# Patient Record
Sex: FEMALE | Race: WHITE
[De-identification: names, ages, dates, MRNs, and addresses within clinical notes are randomized per-mention and may not be internally consistent; named-entity substitution may affect disease eponyms.]

---

## 2017-05-03 ENCOUNTER — HOSPITAL ENCOUNTER (OUTPATIENT)
Dept: HOSPITAL 35 - CAT | Age: 82
End: 2017-05-03
Attending: INTERNAL MEDICINE
Payer: COMMERCIAL

## 2017-05-03 DIAGNOSIS — R91.8: Primary | ICD-10-CM

## 2017-09-13 ENCOUNTER — HOSPITAL ENCOUNTER (OUTPATIENT)
Dept: HOSPITAL 35 - RAD | Age: 82
End: 2017-09-13
Attending: INTERNAL MEDICINE
Payer: COMMERCIAL

## 2017-09-13 DIAGNOSIS — I51.7: Primary | ICD-10-CM

## 2018-01-04 ENCOUNTER — HOSPITAL ENCOUNTER (INPATIENT)
Dept: HOSPITAL 35 - ER | Age: 83
LOS: 6 days | Discharge: HOME | DRG: 177 | End: 2018-01-10
Attending: HOSPITALIST | Admitting: HOSPITALIST
Payer: COMMERCIAL

## 2018-01-04 VITALS — BODY MASS INDEX: 25.6 KG/M2 | WEIGHT: 127 LBS | HEIGHT: 59.02 IN

## 2018-01-04 VITALS — DIASTOLIC BLOOD PRESSURE: 80 MMHG | SYSTOLIC BLOOD PRESSURE: 171 MMHG

## 2018-01-04 VITALS — DIASTOLIC BLOOD PRESSURE: 52 MMHG | SYSTOLIC BLOOD PRESSURE: 166 MMHG

## 2018-01-04 VITALS — SYSTOLIC BLOOD PRESSURE: 167 MMHG | DIASTOLIC BLOOD PRESSURE: 72 MMHG

## 2018-01-04 VITALS — SYSTOLIC BLOOD PRESSURE: 151 MMHG | DIASTOLIC BLOOD PRESSURE: 61 MMHG

## 2018-01-04 DIAGNOSIS — E78.00: ICD-10-CM

## 2018-01-04 DIAGNOSIS — Z88.2: ICD-10-CM

## 2018-01-04 DIAGNOSIS — K80.20: ICD-10-CM

## 2018-01-04 DIAGNOSIS — E78.5: ICD-10-CM

## 2018-01-04 DIAGNOSIS — K57.90: ICD-10-CM

## 2018-01-04 DIAGNOSIS — Z98.41: ICD-10-CM

## 2018-01-04 DIAGNOSIS — N17.9: ICD-10-CM

## 2018-01-04 DIAGNOSIS — E43: ICD-10-CM

## 2018-01-04 DIAGNOSIS — Z85.3: ICD-10-CM

## 2018-01-04 DIAGNOSIS — Z96.1: ICD-10-CM

## 2018-01-04 DIAGNOSIS — D50.9: ICD-10-CM

## 2018-01-04 DIAGNOSIS — M81.0: ICD-10-CM

## 2018-01-04 DIAGNOSIS — Z90.49: ICD-10-CM

## 2018-01-04 DIAGNOSIS — I25.10: ICD-10-CM

## 2018-01-04 DIAGNOSIS — Z95.5: ICD-10-CM

## 2018-01-04 DIAGNOSIS — K21.9: ICD-10-CM

## 2018-01-04 DIAGNOSIS — Z95.1: ICD-10-CM

## 2018-01-04 DIAGNOSIS — Z88.0: ICD-10-CM

## 2018-01-04 DIAGNOSIS — Z98.42: ICD-10-CM

## 2018-01-04 DIAGNOSIS — H35.30: ICD-10-CM

## 2018-01-04 DIAGNOSIS — E87.2: ICD-10-CM

## 2018-01-04 DIAGNOSIS — J69.0: Primary | ICD-10-CM

## 2018-01-04 DIAGNOSIS — N18.9: ICD-10-CM

## 2018-01-04 DIAGNOSIS — Z90.13: ICD-10-CM

## 2018-01-04 DIAGNOSIS — I12.9: ICD-10-CM

## 2018-01-04 DIAGNOSIS — E87.1: ICD-10-CM

## 2018-01-04 DIAGNOSIS — K44.9: ICD-10-CM

## 2018-01-04 LAB
%HYPO/RBC NFR BLD AUTO: (no result) %
ALBUMIN SERPL-MCNC: 3.8 G/DL (ref 3.4–5)
ALT SERPL-CCNC: 17 U/L (ref 30–65)
ANION GAP SERPL CALC-SCNC: 10 MMOL/L (ref 7–16)
ANISOCYTOSIS BLD QL SMEAR: (no result)
AST SERPL-CCNC: 14 U/L (ref 15–37)
BILIRUB SERPL-MCNC: 0.3 MG/DL
BUN SERPL-MCNC: 23 MG/DL (ref 7–18)
CALCIUM SERPL-MCNC: 9.2 MG/DL (ref 8.5–10.1)
CHLORIDE SERPL-SCNC: 96 MMOL/L (ref 98–107)
CO2 SERPL-SCNC: 24 MMOL/L (ref 21–32)
CREAT SERPL-MCNC: 1.9 MG/DL (ref 0.6–1)
ERYTHROCYTE [DISTWIDTH] IN BLOOD BY AUTOMATED COUNT: 14.4 % (ref 10.5–14.5)
GLUCOSE SERPL-MCNC: 119 MG/DL (ref 74–106)
GRANULOCYTES NFR BLD MANUAL: 89 % (ref 36–66)
HCT VFR BLD CALC: 26 % (ref 37–47)
HGB BLD-MCNC: 9.2 GM/DL (ref 12–15)
LYMPHOCYTES NFR BLD AUTO: 7 % (ref 24–44)
MAGNESIUM SERPL-MCNC: 1.9 MG/DL (ref 1.8–2.4)
MCH RBC QN AUTO: 28.1 PG (ref 26–34)
MCHC RBC AUTO-ENTMCNC: 35.3 G/DL (ref 28–37)
MCV RBC: 79.7 FL (ref 80–100)
MICROCYTES: (no result)
MONOCYTES NFR BLD: 4 % (ref 1–8)
NEUTROPHILS # BLD: 5.8 THOU/UL (ref 1.4–8.2)
PLATELET # BLD: 257 THOU/UL (ref 150–400)
POLYCHROMASIA BLD QL SMEAR: (no result)
POTASSIUM SERPL-SCNC: 4.2 MMOL/L (ref 3.5–5.1)
PROT SERPL-MCNC: 7.4 G/DL (ref 6.4–8.2)
RBC # BLD AUTO: 3.27 MIL/UL (ref 4.2–5)
SODIUM SERPL-SCNC: 130 MMOL/L (ref 136–145)
TROPONIN I SERPL-MCNC: < 0.04 NG/ML (ref ?–0.06)
WBC # BLD AUTO: 6.5 THOU/UL (ref 4–11)

## 2018-01-04 PROCEDURE — 10195: CPT

## 2018-01-04 PROCEDURE — 62900: CPT

## 2018-01-04 PROCEDURE — 62110: CPT

## 2018-01-04 PROCEDURE — 70005: CPT

## 2018-01-05 VITALS — SYSTOLIC BLOOD PRESSURE: 122 MMHG | DIASTOLIC BLOOD PRESSURE: 69 MMHG

## 2018-01-05 VITALS — SYSTOLIC BLOOD PRESSURE: 136 MMHG | DIASTOLIC BLOOD PRESSURE: 51 MMHG

## 2018-01-05 VITALS — DIASTOLIC BLOOD PRESSURE: 58 MMHG | SYSTOLIC BLOOD PRESSURE: 123 MMHG

## 2018-01-05 VITALS — SYSTOLIC BLOOD PRESSURE: 144 MMHG | DIASTOLIC BLOOD PRESSURE: 64 MMHG

## 2018-01-05 VITALS — SYSTOLIC BLOOD PRESSURE: 129 MMHG | DIASTOLIC BLOOD PRESSURE: 62 MMHG

## 2018-01-06 VITALS — SYSTOLIC BLOOD PRESSURE: 153 MMHG | DIASTOLIC BLOOD PRESSURE: 71 MMHG

## 2018-01-06 VITALS — SYSTOLIC BLOOD PRESSURE: 124 MMHG | DIASTOLIC BLOOD PRESSURE: 53 MMHG

## 2018-01-06 VITALS — SYSTOLIC BLOOD PRESSURE: 118 MMHG | DIASTOLIC BLOOD PRESSURE: 48 MMHG

## 2018-01-06 VITALS — DIASTOLIC BLOOD PRESSURE: 98 MMHG | SYSTOLIC BLOOD PRESSURE: 134 MMHG

## 2018-01-06 LAB
BILIRUB UR-MCNC: NEGATIVE MG/DL
COLOR UR: YELLOW
KETONES UR STRIP-MCNC: NEGATIVE MG/DL
RBC # UR STRIP: NEGATIVE /UL
SP GR UR STRIP: 1.02 (ref 1–1.03)
URINE CLARITY: CLEAR
URINE GLUCOSE-RANDOM*: NEGATIVE
URINE LEUKOCYTES-REFLEX: NEGATIVE
URINE NITRITE-REFLEX: NEGATIVE
URINE PROTEIN (DIPSTICK): NEGATIVE
UROBILINOGEN UR STRIP-ACNC: 0.2 E.U./DL (ref 0.2–1)

## 2018-01-07 VITALS — SYSTOLIC BLOOD PRESSURE: 145 MMHG | DIASTOLIC BLOOD PRESSURE: 70 MMHG

## 2018-01-07 VITALS — SYSTOLIC BLOOD PRESSURE: 130 MMHG | DIASTOLIC BLOOD PRESSURE: 53 MMHG

## 2018-01-07 VITALS — SYSTOLIC BLOOD PRESSURE: 158 MMHG | DIASTOLIC BLOOD PRESSURE: 64 MMHG

## 2018-01-07 VITALS — SYSTOLIC BLOOD PRESSURE: 161 MMHG | DIASTOLIC BLOOD PRESSURE: 72 MMHG

## 2018-01-07 LAB
ALBUMIN SERPL-MCNC: 3.1 G/DL (ref 3.4–5)
ANION GAP SERPL CALC-SCNC: 11 MMOL/L (ref 7–16)
ANION GAP SERPL CALC-SCNC: 12 MMOL/L (ref 7–16)
BILIRUB UR-MCNC: NEGATIVE MG/DL
BUN SERPL-MCNC: 31 MG/DL (ref 7–18)
BUN SERPL-MCNC: 32 MG/DL (ref 7–18)
CALCIUM SERPL-MCNC: 7.8 MG/DL (ref 8.5–10.1)
CALCIUM SERPL-MCNC: 7.9 MG/DL (ref 8.5–10.1)
CHLORIDE SERPL-SCNC: 89 MMOL/L (ref 98–107)
CHLORIDE SERPL-SCNC: 90 MMOL/L (ref 98–107)
CO2 SERPL-SCNC: 18 MMOL/L (ref 21–32)
CO2 SERPL-SCNC: 18 MMOL/L (ref 21–32)
COLOR UR: YELLOW
CREAT SERPL-MCNC: 2 MG/DL (ref 0.6–1)
CREAT SERPL-MCNC: 2.1 MG/DL (ref 0.6–1)
ERYTHROCYTE [DISTWIDTH] IN BLOOD BY AUTOMATED COUNT: 14.8 % (ref 10.5–14.5)
GLUCOSE SERPL-MCNC: 140 MG/DL (ref 74–106)
GLUCOSE SERPL-MCNC: 140 MG/DL (ref 74–106)
HCT VFR BLD CALC: 20.7 % (ref 37–47)
HGB BLD-MCNC: 7.3 GM/DL (ref 12–15)
KETONES UR STRIP-MCNC: NEGATIVE MG/DL
MCH RBC QN AUTO: 27.2 PG (ref 26–34)
MCHC RBC AUTO-ENTMCNC: 35.5 G/DL (ref 28–37)
MCV RBC: 76.6 FL (ref 80–100)
NITRITE UR QL STRIP: NEGATIVE
PHOSPHATE SERPL-MCNC: 4.3 MG/DL (ref 2.5–4.9)
PLATELET # BLD: 226 THOU/UL (ref 150–400)
POTASSIUM SERPL-SCNC: 4.8 MMOL/L (ref 3.5–5.1)
POTASSIUM SERPL-SCNC: 4.9 MMOL/L (ref 3.5–5.1)
RBC # BLD AUTO: 2.7 MIL/UL (ref 4.2–5)
RBC # UR STRIP: NEGATIVE /UL
SODIUM SERPL-SCNC: 118 MMOL/L (ref 136–145)
SODIUM SERPL-SCNC: 120 MMOL/L (ref 136–145)
SP GR UR STRIP: 1.01 (ref 1–1.03)
URINE CLARITY: CLEAR
URINE CREATININE-RANDOM*: 57 MG/DL
URINE GLUCOSE-RANDOM*: NEGATIVE
URINE LEUKOCYTES: NEGATIVE
URINE PROTEIN (DIPSTICK): NEGATIVE
URINE PROTEIN-RANDOM*: 15.6 MG/DL (ref ?–11.9)
URINE SODIUM-RANDOM*: 25 MMOL/L
UROBILINOGEN UR STRIP-ACNC: 0.2 E.U./DL (ref 0.2–1)
WBC # BLD AUTO: 10.1 THOU/UL (ref 4–11)

## 2018-01-08 VITALS — DIASTOLIC BLOOD PRESSURE: 72 MMHG | SYSTOLIC BLOOD PRESSURE: 143 MMHG

## 2018-01-08 VITALS — SYSTOLIC BLOOD PRESSURE: 174 MMHG | DIASTOLIC BLOOD PRESSURE: 75 MMHG

## 2018-01-08 VITALS — SYSTOLIC BLOOD PRESSURE: 151 MMHG | DIASTOLIC BLOOD PRESSURE: 78 MMHG

## 2018-01-08 VITALS — SYSTOLIC BLOOD PRESSURE: 176 MMHG | DIASTOLIC BLOOD PRESSURE: 96 MMHG

## 2018-01-08 LAB
ALBUMIN SERPL-MCNC: 3.3 G/DL (ref 3.4–5)
ALT SERPL-CCNC: 44 U/L (ref 30–65)
ANION GAP SERPL CALC-SCNC: 12 MMOL/L (ref 7–16)
AST SERPL-CCNC: 27 U/L (ref 15–37)
BILIRUB SERPL-MCNC: 0.4 MG/DL
BUN SERPL-MCNC: 33 MG/DL (ref 7–18)
CALCIUM SERPL-MCNC: 8.1 MG/DL (ref 8.5–10.1)
CHLORIDE SERPL-SCNC: 88 MMOL/L (ref 98–107)
CO2 SERPL-SCNC: 19 MMOL/L (ref 21–32)
CREAT SERPL-MCNC: 1.8 MG/DL (ref 0.6–1)
GLUCOSE SERPL-MCNC: 135 MG/DL (ref 74–106)
PHOSPHATE SERPL-MCNC: 4.1 MG/DL (ref 2.5–4.9)
POTASSIUM SERPL-SCNC: 4.7 MMOL/L (ref 3.5–5.1)
PROT SERPL-MCNC: 6 G/DL (ref 6.4–8.2)
SODIUM SERPL-SCNC: 119 MMOL/L (ref 136–145)

## 2018-01-09 VITALS — DIASTOLIC BLOOD PRESSURE: 63 MMHG | SYSTOLIC BLOOD PRESSURE: 153 MMHG

## 2018-01-09 VITALS — SYSTOLIC BLOOD PRESSURE: 157 MMHG | DIASTOLIC BLOOD PRESSURE: 78 MMHG

## 2018-01-09 VITALS — SYSTOLIC BLOOD PRESSURE: 154 MMHG | DIASTOLIC BLOOD PRESSURE: 82 MMHG

## 2018-01-09 VITALS — SYSTOLIC BLOOD PRESSURE: 148 MMHG | DIASTOLIC BLOOD PRESSURE: 63 MMHG

## 2018-01-09 LAB
ALBUMIN SERPL-MCNC: 3.2 G/DL (ref 3.4–5)
ANION GAP SERPL CALC-SCNC: 11 MMOL/L (ref 7–16)
ANION GAP SERPL CALC-SCNC: 11 MMOL/L (ref 7–16)
ANISOCYTOSIS BLD QL SMEAR: (no result)
BASOPHILS NFR BLD AUTO: 0 % (ref 0–2)
BUN SERPL-MCNC: 31 MG/DL (ref 7–18)
BUN SERPL-MCNC: 31 MG/DL (ref 7–18)
CALCIUM SERPL-MCNC: 8.2 MG/DL (ref 8.5–10.1)
CALCIUM SERPL-MCNC: 8.3 MG/DL (ref 8.5–10.1)
CHLORIDE SERPL-SCNC: 89 MMOL/L (ref 98–107)
CHLORIDE SERPL-SCNC: 89 MMOL/L (ref 98–107)
CO2 SERPL-SCNC: 19 MMOL/L (ref 21–32)
CO2 SERPL-SCNC: 19 MMOL/L (ref 21–32)
CREAT SERPL-MCNC: 1.7 MG/DL (ref 0.6–1)
CREAT SERPL-MCNC: 1.9 MG/DL (ref 0.6–1)
EOSINOPHIL NFR BLD: 0 % (ref 0–3)
ERYTHROCYTE [DISTWIDTH] IN BLOOD BY AUTOMATED COUNT: 14.5 % (ref 10.5–14.5)
GLUCOSE SERPL-MCNC: 125 MG/DL (ref 74–106)
GLUCOSE SERPL-MCNC: 95 MG/DL (ref 74–106)
GRANULOCYTES NFR BLD MANUAL: 79 % (ref 36–66)
HCT VFR BLD CALC: 23.7 % (ref 37–47)
HGB BLD-MCNC: 8.1 GM/DL (ref 12–15)
IRON SERPL-MCNC: 21 UG/DL (ref 50–170)
LYMPHOCYTES NFR BLD AUTO: 11 % (ref 24–44)
MCH RBC QN AUTO: 27.1 PG (ref 26–34)
MCHC RBC AUTO-ENTMCNC: 34.2 G/DL (ref 28–37)
MCV RBC: 79.3 FL (ref 80–100)
MICROCYTES: (no result)
MONOCYTES NFR BLD: 8 % (ref 1–8)
NEUTROPHILS # BLD: 8.8 THOU/UL (ref 1.4–8.2)
NEUTS BAND NFR BLD: 1 % (ref 0–8)
PHOSPHATE SERPL-MCNC: 4 MG/DL (ref 2.5–4.9)
PLATELET # BLD: 289 THOU/UL (ref 150–400)
POTASSIUM SERPL-SCNC: 4.4 MMOL/L (ref 3.5–5.1)
POTASSIUM SERPL-SCNC: 4.4 MMOL/L (ref 3.5–5.1)
RBC # BLD AUTO: 2.99 MIL/UL (ref 4.2–5)
SAO2 % BLD FROM PO2: 7 % (ref 20–39)
SODIUM SERPL-SCNC: 119 MMOL/L (ref 136–145)
SODIUM SERPL-SCNC: 119 MMOL/L (ref 136–145)
TIBC SERPL-MCNC: 314 UG/DL (ref 250–450)
VARIANT LYMPHS NFR BLD MANUAL: 1 %
WBC # BLD AUTO: 11 THOU/UL (ref 4–11)

## 2018-01-10 VITALS — DIASTOLIC BLOOD PRESSURE: 59 MMHG | SYSTOLIC BLOOD PRESSURE: 143 MMHG

## 2018-01-10 VITALS — DIASTOLIC BLOOD PRESSURE: 63 MMHG | SYSTOLIC BLOOD PRESSURE: 148 MMHG

## 2018-01-10 LAB
ANION GAP SERPL CALC-SCNC: 11 MMOL/L (ref 7–16)
BASOPHILS NFR BLD AUTO: 0.1 % (ref 0–2)
BUN SERPL-MCNC: 31 MG/DL (ref 7–18)
CALCIUM SERPL-MCNC: 8.3 MG/DL (ref 8.5–10.1)
CHLORIDE SERPL-SCNC: 95 MMOL/L (ref 98–107)
CO2 SERPL-SCNC: 21 MMOL/L (ref 21–32)
CREAT SERPL-MCNC: 1.8 MG/DL (ref 0.6–1)
EOSINOPHIL NFR BLD: 0.2 % (ref 0–3)
ERYTHROCYTE [DISTWIDTH] IN BLOOD BY AUTOMATED COUNT: 14.4 % (ref 10.5–14.5)
GLUCOSE SERPL-MCNC: 91 MG/DL (ref 74–106)
GRANULOCYTES NFR BLD MANUAL: 80 % (ref 36–66)
HCT VFR BLD CALC: 23.5 % (ref 37–47)
HGB BLD-MCNC: 7.9 GM/DL (ref 12–15)
LYMPHOCYTES NFR BLD AUTO: 8.1 % (ref 24–44)
MCH RBC QN AUTO: 26.5 PG (ref 26–34)
MCHC RBC AUTO-ENTMCNC: 33.6 G/DL (ref 28–37)
MCV RBC: 78.7 FL (ref 80–100)
MONOCYTES NFR BLD: 11.6 % (ref 1–8)
NEUTROPHILS # BLD: 7 THOU/UL (ref 1.4–8.2)
PLATELET # BLD: 246 THOU/UL (ref 150–400)
POTASSIUM SERPL-SCNC: 3.7 MMOL/L (ref 3.5–5.1)
RBC # BLD AUTO: 2.98 MIL/UL (ref 4.2–5)
SODIUM SERPL-SCNC: 127 MMOL/L (ref 136–145)
WBC # BLD AUTO: 8.7 THOU/UL (ref 4–11)

## 2018-01-10 PROCEDURE — 0DB68ZX EXCISION OF STOMACH, VIA NATURAL OR ARTIFICIAL OPENING ENDOSCOPIC, DIAGNOSTIC: ICD-10-PCS | Performed by: INTERNAL MEDICINE

## 2018-01-10 PROCEDURE — 0D758ZZ DILATION OF ESOPHAGUS, VIA NATURAL OR ARTIFICIAL OPENING ENDOSCOPIC: ICD-10-PCS | Performed by: INTERNAL MEDICINE

## 2018-07-15 ENCOUNTER — HOSPITAL ENCOUNTER (INPATIENT)
Dept: HOSPITAL 96 - M.ERS | Age: 83
LOS: 5 days | Discharge: HOME | DRG: 417 | End: 2018-07-20
Attending: INTERNAL MEDICINE | Admitting: INTERNAL MEDICINE
Payer: MEDICARE

## 2018-07-15 VITALS — HEIGHT: 59.02 IN | BODY MASS INDEX: 25.8 KG/M2 | WEIGHT: 128 LBS

## 2018-07-15 VITALS — SYSTOLIC BLOOD PRESSURE: 147 MMHG | DIASTOLIC BLOOD PRESSURE: 82 MMHG

## 2018-07-15 VITALS — SYSTOLIC BLOOD PRESSURE: 123 MMHG | DIASTOLIC BLOOD PRESSURE: 44 MMHG

## 2018-07-15 VITALS — SYSTOLIC BLOOD PRESSURE: 173 MMHG | DIASTOLIC BLOOD PRESSURE: 67 MMHG

## 2018-07-15 VITALS — DIASTOLIC BLOOD PRESSURE: 64 MMHG | SYSTOLIC BLOOD PRESSURE: 175 MMHG

## 2018-07-15 DIAGNOSIS — Z90.49: ICD-10-CM

## 2018-07-15 DIAGNOSIS — N39.0: ICD-10-CM

## 2018-07-15 DIAGNOSIS — Z88.0: ICD-10-CM

## 2018-07-15 DIAGNOSIS — N18.9: ICD-10-CM

## 2018-07-15 DIAGNOSIS — Z90.13: ICD-10-CM

## 2018-07-15 DIAGNOSIS — Z79.899: ICD-10-CM

## 2018-07-15 DIAGNOSIS — K57.92: ICD-10-CM

## 2018-07-15 DIAGNOSIS — I13.0: ICD-10-CM

## 2018-07-15 DIAGNOSIS — M62.82: ICD-10-CM

## 2018-07-15 DIAGNOSIS — E78.5: ICD-10-CM

## 2018-07-15 DIAGNOSIS — K44.9: ICD-10-CM

## 2018-07-15 DIAGNOSIS — Z79.82: ICD-10-CM

## 2018-07-15 DIAGNOSIS — Z79.2: ICD-10-CM

## 2018-07-15 DIAGNOSIS — E43: ICD-10-CM

## 2018-07-15 DIAGNOSIS — Z82.49: ICD-10-CM

## 2018-07-15 DIAGNOSIS — I50.33: ICD-10-CM

## 2018-07-15 DIAGNOSIS — F32.9: ICD-10-CM

## 2018-07-15 DIAGNOSIS — Z88.2: ICD-10-CM

## 2018-07-15 DIAGNOSIS — N17.9: ICD-10-CM

## 2018-07-15 DIAGNOSIS — I25.10: ICD-10-CM

## 2018-07-15 DIAGNOSIS — E87.1: ICD-10-CM

## 2018-07-15 DIAGNOSIS — Z95.1: ICD-10-CM

## 2018-07-15 DIAGNOSIS — D64.9: ICD-10-CM

## 2018-07-15 DIAGNOSIS — E83.42: ICD-10-CM

## 2018-07-15 DIAGNOSIS — Z91.040: ICD-10-CM

## 2018-07-15 DIAGNOSIS — K80.11: Primary | ICD-10-CM

## 2018-07-15 DIAGNOSIS — R79.89: ICD-10-CM

## 2018-07-15 DIAGNOSIS — Z85.3: ICD-10-CM

## 2018-07-15 DIAGNOSIS — M31.9: ICD-10-CM

## 2018-07-15 LAB
ABSOLUTE MONOCYTES: 0.2 THOU/UL (ref 0–1.2)
ALBUMIN SERPL-MCNC: 3.8 G/DL (ref 3.4–5)
ALP SERPL-CCNC: 212 U/L (ref 46–116)
ALT SERPL-CCNC: 45 U/L (ref 30–65)
ANION GAP SERPL CALC-SCNC: 10 MMOL/L (ref 7–16)
APTT BLD: 30.3 SECONDS (ref 25–31.3)
AST SERPL-CCNC: 117 U/L (ref 15–37)
BACTERIA-REFLEX: (no result) /HPF
BILIRUB SERPL-MCNC: 0.8 MG/DL
BILIRUB UR-MCNC: NEGATIVE MG/DL
BUN SERPL-MCNC: 20 MG/DL (ref 7–18)
CALCIUM SERPL-MCNC: 8.8 MG/DL (ref 8.5–10.1)
CHLORIDE SERPL-SCNC: 96 MMOL/L (ref 98–107)
CK-MB MASS: 1.9 NG/ML
CO2 SERPL-SCNC: 22 MMOL/L (ref 21–32)
COLOR UR: YELLOW
CREAT SERPL-MCNC: 2.1 MG/DL (ref 0.6–1.3)
GLUCOSE SERPL-MCNC: 162 MG/DL (ref 70–99)
GRANULOCYTES NFR BLD MANUAL: 95 %
HCT VFR BLD CALC: 28.1 % (ref 37–47)
HGB BLD-MCNC: 9.4 GM/DL (ref 12–15)
HYALINE CASTS #/AREA URNS LPF: (no result) /LPF
INR PPP: 1.1
KETONES UR STRIP-MCNC: NEGATIVE MG/DL
LIPASE: 174 U/L (ref 73–393)
LYMPHOCYTES # BLD: 0.3 THOU/UL (ref 0.8–5.3)
LYMPHOCYTES NFR BLD AUTO: 3 %
MAGNESIUM SERPL-MCNC: 1.7 MG/DL (ref 1.8–2.4)
MCH RBC QN AUTO: 26 PG (ref 26–34)
MCHC RBC AUTO-ENTMCNC: 33.5 G/DL (ref 28–37)
MCV RBC: 77.6 FL (ref 80–100)
MONOCYTES NFR BLD: 2 %
MPV: 7 FL. (ref 7.2–11.1)
MUCUS: (no result) STRN/LPF
NEUTROPHILS # BLD: 9.1 THOU/UL (ref 1.6–8.1)
NT-PRO BRAIN NAT PEPTIDE: 1339 PG/ML (ref ?–300)
NUCLEATED RBCS: 0 /100WBC
PLATELET # BLD EST: ADEQUATE 10*3/UL
PLATELET COUNT*: 304 THOU/UL (ref 150–400)
POTASSIUM SERPL-SCNC: 3.6 MMOL/L (ref 3.5–5.1)
PROT SERPL-MCNC: 7.4 G/DL (ref 6.4–8.2)
PROT UR QL STRIP: (no result)
PROTHROMBIN TIME: 10.5 SECONDS (ref 9.2–11.5)
RBC # BLD AUTO: 3.62 MIL/UL (ref 4.2–5)
RBC # UR STRIP: NEGATIVE /UL
RBC #/AREA URNS HPF: (no result) /HPF (ref 0–2)
RDW-CV: 15.3 % (ref 10.5–14.5)
SODIUM SERPL-SCNC: 128 MMOL/L (ref 136–145)
SP GR UR STRIP: 1.01 (ref 1–1.03)
SQUAMOUS: (no result) /LPF (ref 0–3)
TROPONIN-I LEVEL: <0.06 NG/ML (ref ?–0.06)
URINE CLARITY: CLEAR
URINE GLUCOSE-RANDOM: NEGATIVE
URINE LEUKOCYTES-REFLEX: (no result)
URINE NITRITE-REFLEX: POSITIVE
URINE WBC-REFLEX: (no result) /HPF (ref 0–5)
UROBILINOGEN UR STRIP-ACNC: 0.2 E.U./DL (ref 0.2–1)
WBC # BLD AUTO: 9.6 THOU/UL (ref 4–11)

## 2018-07-15 NOTE — NUR
PATIENT ADMITTED TO ROOM 202 FROM ER. ALERT AND ORIENTED 4. DENIES CHEST PAIN,
C/O ABD PAIN AND NAUSEA. PRN PHENERGAN INFUSING AT THIS TIME. DR. HERNANDEZ
NOTIIED OF NEGATIVE CARDIAC ENZYMES AND EKG. UP TO BATHROOM WITH ASSISTANCE.
REFUSING DINNER THIS EVENING. ORIENTED TO CALL LIGHT. CALL LIGHT WITHIN REACH,
WILL CONTINUE TO MONITOR.

## 2018-07-15 NOTE — OP
[facility] 
[address] [place]  [address]                    OPERATIVE REPORT              
_______________________________________________________________________________
 
Name:       KRISTIN ROMO                  Room:           [address] [place]  
M.R.#:  [id number]      Account #:      [id number]  
Admission:  07/15/18     Attend Phys:    BONY Cobb
Discharge:               Date of Birth:  [birth year]  
         Report #: [id number]
                                                                     [id number]  
_______________________________________________________________________________
THIS REPORT FOR:  //name//                      
 
CC: Nevin Sebastian
 
DATE OF SERVICE:  07/18/2018
 
 
PREPROCEDURE DIAGNOSIS:  Cholecystitis.
 
POSTOPERATIVE DIAGNOSES:  Cholelithiasis, cholecystitis.
 
SURGEON:  Bettie Dodson M.D.
 
ASSISTANT:
Silverio Smith, PGY-3; Andrzej Guido, PGY-2.
 
OPERATION PERFORMED:  Laparoscopic cholecystectomy with intraoperative
cholangiogram.
 
ANESTHESIA:  General endotracheal and local.
 
ESTIMATED BLOOD LOSS:  250 mL.
 
SPECIMEN REMOVED:  Gallbladder.
 
COMPLICATIONS:  None.
 
INDICATIONS FOR PROCEDURE:  The patient is an 86-year-old female who was
admitted to the hospital with abdominal pain.  She began having pain after
eating a high fat meal without any nausea, vomiting, fevers or chills.  Pain was
in the right upper quadrant with radiation to the right scapula.  On ultrasound,
she was found to have thickened gallbladder wall as well as gallstones.  On
presentation, she also had symptoms of urinary tract infection and an elevated
troponin.  Imaging did reveal dilated ducts, so she underwent an MRCP
preoperatively that showed intra and extrahepatic ductal dilatation.  However,
her bilirubin was not elevated.  Her AST, ALT and alkaline phosphatase were
elevated, but down trending prior to her operation,
 
DESCRIPTION OF PROCEDURE:  After informed consent was obtained, with risks
including but not limited to bleeding, infection, damage to common bile duct,
damage to other intra-abdominal structures, hernia pain at incisions, and
catastrophic complications including cardiopulmonary failure and death, the
patient was brought to the operating room and placed in a supine position. 
General anesthesia was administered.  The patient was prepped and draped in the
 
 
 
[place] [address]                    OPERATIVE REPORT              
_______________________________________________________________________________
 
Name:       KRISTIN ROMO                  Room:           [address] [place]  
[facility].#:  [id number]      Account #:      [id number]  
Admission:  07/15/18     Attend Phys:    BONY Cobb
Discharge:               Date of Birth:  [birth year]  
         Report #: [id number]
                                                                     [id number]  
_______________________________________________________________________________
usual sterile fashion.  Antibiotics were normal scheduled antibiotics given on
the floor.  A surgical pause was held to confirm proper patient and procedure. 
A 12 mm vertical incision was made superior to the umbilicus.  Dissection was
carried down using Bovie electrocautery until the fascia was identified and
elevated with 2 Kochers, incised further using Bovie electrocautery.  The
peritoneum was bluntly entered.  A finger sweep was performed to ensure no
adhesions underlying.  The 0 Vicryl was placed on either side of the fascia. 
Lyly port was introduced under direct visualization and secured with the 0
Vicryl.  The abdomen was insufflated.  Camera was introduced and a brief
exploration of the abdomen was undertaken.  No damage to structures under the
incision was noted.  Attention was turned to the right upper quadrant.  There
was a great deal of omentum stuck to the liver and the gallbladder fossa.  This
was bluntly swept down with ease.  An additional 11 mm port was placed in the
epigastrium and two 5 mm ports in the right upper quadrant were all introduced
under direct visualization.  After these adhesions were swept down, the
gallbladder was found to be distended.  It was aspirated with dark green
contents removed, approximately 60 mL.  The gallbladder was then elevated. 
Adhesions between the duodenum and the gallbladder as well as the omentum were
taken down with a combination of cautery and Maryland dissector.  The fundus of
the gallbladder was retracted laterally.  The cystic duct was carefully
dissected using the Maryland dissector and the cystic artery as well. 
Retraction of the gallbladder caused a small portion to tear from the liver bed
with some bleeding on the liver bed.  Hemostasis was achieved using cautery.  A
critical view was obtained and confirmed.  A clip was placed distally on the
cystic duct.  A 14 gauge Angiocath was introduced through the right upper
quadrant through which the cholangiogram catheter was introduced.  Scissors were
then used to make an incision in the cystic duct.  The cholangiogram catheter
was then introduced through this hole and secured with a clip.  A cholangiogram
was performed with dilated ducts intra- and extrahepatic noted.  There was great
resistance to filling.  There was noted to be contrast into the duodenum.  The
cystic duct, common duct, common hepatic ducts on left and right were all
identified and the cystic duct was confirmed as previously thought on critical
view.  Glucagon was administered and additional saline and contrast was flushed
to attempt to decrease the pressure and increase further flow into the duodenum.
 This was unsuccessful.  The clip securing the catheter was removed.  The
catheter was removed from the incision.  The cystic duct was quadruply clipped
proximally.  The cystic artery was doubly clipped proximally and singly clipped
distally.  These were cut using scissors.  Gallbladder was then dissected
carefully free from the liver bed using Bovie electrocautery.  The gallbladder
again tore from the liver bed causing some bleeding from the liver bed.  This
was cauterized extensively.  During cautery the bleeding became more brisk. 
Pressure was held directly with immediate hemostasis.  Surgicel was introduced
into the abdomen and direct pressure was held against this portion of the liver
bed and the gallbladder fossa while the remainder of the gallbladder was
dissected free, placed in an EndoCatch bag and removed through the 11 mm
epigastric port.  The liver bed was inspected and noted to be hemostatic. 
 
 
 
[address]  [address]                    OPERATIVE REPORT              
_______________________________________________________________________________
 
Name:       KRISTIN ROMO                  Room:           [address] [place]  
[facility].#:  [id number]      Account #:      [id number]  
Admission:  07/15/18     Attend Phys:    BONY Cobb
Discharge:               Date of Birth:  [birth year]  
         Report #: [id number]
                                                                     [id number]  
_______________________________________________________________________________
FloSeal was used to fill the gallbladder fossa and an additional piece of
Surgicel was placed within the gallbladder fossa.  A suction  was used
to copiously irrigate and suction the right upper quadrant.  The area was
hemostatic after application of topical agents.  All ports were removed under
direct visualization.  The abdomen was desufflated and reinsufflated to note any
further bleeding under less pressure, it was still hemostatic.  Prior to
de-sufflation the Alex-Carlos was used to close the epigastric port with an
0 Vicryl.  The abdomen was desufflated.  The fascia was elevated using previous
stay sutures.  An additional 0 Vicryl was placed in a figure-of-eight fashion to
close the fascia.  The stay sutures were closed over top of this.  A 4-0
Monocryl was used to close all skin incisions.  Wounds were cleansed and dressed
with Dermabond.  The patient tolerated the procedure well and was transferred to
the PACU in stable condition.
 
 
 
 
 
 
 
 
 
 
 
 
 
 
 
 
 
 
 
 
 
 
 
 
 
 
 
 
 
 
 
                       
                                        By:                                
                 
D: 07/18/18 1600_______________________________________
T: 07/18/18 163DO enedina Bell

## 2018-07-15 NOTE — PROC
[address]  [address]                    PROCEDURE REPORT              
_______________________________________________________________________________
 
Name:       CLARISSAXIOMARA                  Room:           [address] [place]  
M.R.#:  [id number]      Account #:      [id number]  
Admission:  07/15/18     Attend Phys:    BONY Cobb
Discharge:               Date of Birth:  [birth year]  
         Report #: [id number]
                                                                                
_______________________________________________________________________________
THIS REPORT FOR:  //name//                      
 
For GI report, please see the Provation report in Perceptive 7 content.
 
 
 
 
 
 
 
 
 
 
 
 
 
 
 
 
 
 
 
 
 
 
 
 
 
 
 
 
 
 
 
 
 
 
 
 
 
 
 
 
 
                       
                                        By:                                
                 
D: 07/17/18     _______________________________________
T: 07/18/18 [facility]       /AL

## 2018-07-16 VITALS — SYSTOLIC BLOOD PRESSURE: 140 MMHG | DIASTOLIC BLOOD PRESSURE: 42 MMHG

## 2018-07-16 VITALS — DIASTOLIC BLOOD PRESSURE: 55 MMHG | SYSTOLIC BLOOD PRESSURE: 173 MMHG

## 2018-07-16 VITALS — DIASTOLIC BLOOD PRESSURE: 56 MMHG | SYSTOLIC BLOOD PRESSURE: 151 MMHG

## 2018-07-16 VITALS — DIASTOLIC BLOOD PRESSURE: 46 MMHG | SYSTOLIC BLOOD PRESSURE: 133 MMHG

## 2018-07-16 VITALS — SYSTOLIC BLOOD PRESSURE: 144 MMHG | DIASTOLIC BLOOD PRESSURE: 68 MMHG

## 2018-07-16 VITALS — DIASTOLIC BLOOD PRESSURE: 61 MMHG | SYSTOLIC BLOOD PRESSURE: 131 MMHG

## 2018-07-16 NOTE — CON
[address]  [address]                    CONSULTATION                  
_______________________________________________________________________________
 
Name:       KRISTIN ROMO                  Room:           [address] [place]  
M.R.#:  [id number]      Account #:      [id number]  
Admission:  07/15/18     Attend Phys:    BONY Cobb
Discharge:               Date of Birth:  [birth year]  
         Report #: [id number]
                                                                     [id number]  
_______________________________________________________________________________
THIS REPORT FOR:  //name//                      
 
CC: Nevin Franco
 
DATE OF SERVICE:  2018
 
 
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
came to the Emergency Room yesterday complaining of right upper quadrant pain. 
The old records are not available.  The patient has been here to [place] in
the past.  The history is obtained from the patient.  Apparently 6 years ago,
she had chest pain and was admitted to [facility].  She is found
to have multivessel coronary artery disease.  She underwent multivessel bypass
surgery she claims during the night.  She had a free radial graft placed from
her left arm.  She notes a couple of years later, she had a stent placed at [facility].  She actually had a nuclear stress test here at [facility] a year ago, 2017.  Her nuclear stress test showed no perfusion
abnormalities with ejection fraction of 60%.  She also had an echocardiogram at
that time that showed ejection fraction of 60%.  Left ventricular hypertrophy,
left atrial enlargement, mild mitral regurgitation.  The patient states she has
been doing fairly well and continues to see Dr. Bernal.  She goes for walks
every day.  She has had no recent chest pain.  She has been short of breath, but
no palpitations, syncope.  She was doing well until yesterday, she felt a pain
in her right upper quadrant of her abdomen.  She apparently felt nauseated,
vomited.  She was brought here to [place] and admitted.  Cardiology
consultation requested.  She denied any chest pain, palpitations, syncope.  She
has had no blood in her vomit.  She denied any rash.
 
PAST MEDICAL HISTORY:  Otherwise significant for an appendectomy.  She does have
a history of hypertension, hyperlipidemia.  No history of diabetes.
 
MEDICATIONS:  On admission included aspirin, nebivolol, pravastatin, Ranexa.
 
ALLERGIES:  SHE HAS AN ALLERGY TO PENICILLIN, SULFA.
 
FAMILY HISTORY:  Her mom  of heart attack.
 
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  She currently lives in
a alf home.  No smoking or alcohol abuse.
 
REVIEW OF SYSTEMS:  She has had no history of stroke or asthma.  She had a
peptic ulcer years ago.  No history of kidney disease.  She has had breast
cancer in the past.  No psychiatric illness.  No chronic skin condition.
 
PHYSICAL EXAMINATION:
 
 
 
[place] [address]                    CONSULTATION                  
_______________________________________________________________________________
 
Name:       KRISTIN ROMO                  Room:           [address] [place]  
.R.#:  [id number]      Account #:      [id number]  
Admission:  07/15/18     Attend Phys:    BONY Cobb
Discharge:               Date of Birth:  [birth year]  
         Report #: [id number]
                                                                     [id number]  
_______________________________________________________________________________
GENERAL:  Revealed an elderly female, lying in bed.  She appeared in no acute
distress.
VITAL SIGNS:  She had a blood pressure of 140/70, pulse 70.  She is afebrile.
HEENT:  She was anicteric, conjunctiva pink.  Mucous membranes moist.
NECK:  Veins nondistended.  Neck was supple.
CHEST:  Clear to auscultation.
CARDIOVASCULAR:  Regular rate and rhythm without murmur.
ABDOMEN:  Soft and she had mild right upper quadrant tenderness.
EXTREMITIES:  Had no edema.  Dorsalis pedis pulse could not be palpated.
SKIN:  Cool and dry.
NEUROLOGIC:  Nonfocal.
 
LABORATORY DATA:  ECG showed a sinus rhythm with nonspecific T-wave changes. 
Her workup yesterday, she had CT scan of the abdomen and pelvis that showed
gallstone, prominent bile duct, renal scarring, possible diverticulitis.  She
had a CT scan of the chest that showed a hiatal hernia.  She had a previous CT
scan of the head in 2017 that showed no acute abnormality.
 
Sodium 128, BUN 20.  Creatinine 2.1, which is elevated from a year ago.  Liver
function studies showed normal findings.  Troponin was 0.16.  BNP 1339.  White
blood cell count 9.6, hemoglobin 9.4, which was unchanged from 2017.
 
IMPRESSION AND RECOMMENDATIONS:
1.  Right upper quadrant pain.  Suspect cholecystitis.
2.  Coronary artery disease.  No history of angina.  Troponin borderline
elevated.  Recommend no further cardiac workup.
3.  Hypertension.  The patient is on a beta blocker.
4.  Hyperlipidemia.  The patient is on a statin drug.
5.  History of breast cancer.
6.  Chronic kidney disease.
7.  Anemia.  No history of bleeding.
 
 
 
 
 
 
 
 
 
 
 
 
 
<ELECTRONICALLY SIGNED>
                                        By:  Lamonte Medellin MD, [facility]      
18     1621
D: 18 0925_______________________________________
T: 18 1045Davibony Medellin MD, [facility]         /nt

## 2018-07-16 NOTE — NUR
PT HEART RYTHM CONVERTED TO A FIB AT 0400. DR COBOS CONTATED. PT IS
ASYMPTOMATIC , RATE CONTROLLEF I 60 AND BELOW 60. EXCEPT
FOR SOB WHICH WAS DISPLAYED BEFORE BELOW 50 SOMETIMES. STAT EKG ORDER

## 2018-07-16 NOTE — NUR
Pt is A&O. Pt resides at [facility]. Pt is independent with ADLS and
IADLs. Pt stated that she has the choice to either cook her meals or dine in
the DR, Pt stated that the majority of the time she cooks her own meals. House
cleaning is provided. No DME. Hx of [facility]. No hx of SNF. Strong
support sx that is involved in POC. Goal is to return home at dc. Following
for dc needs.

## 2018-07-16 NOTE — EKG
[place] [address]
Phone:  [phone]                     ELECTROCARDIOGRAM REPORT      
_______________________________________________________________________________
 
Name:       KRISTIN ROMO                  Room:           [address]    ADM [place]  
M.R.#:  [id number]      Account #:      [id number]  
Admission:  07/15/18     Attend Phys:    BONY Cobb
Discharge:               Date of Birth:  [birth year]  
         Report #: [id number]
    [id number]
_______________________________________________________________________________
THIS REPORT FOR:  //name//                      
 
                          [facility]
                                       
Test Date:    2018-07-15               Test Time:    19:37:51
Pat Name:     KRISTIN ROMO              Department:   
Patient ID:   [id number]            Room:         [address]
Gender:       F                        Technician:   VI
:          1932               Requested By: Popeye Lennon
Order Number: [id number]    Angel MD:   Lamonte Medellin
                                 Measurements
Intervals                              Axis          
Rate:         80                       P:            8
CA:           239                      QRS:          -24
QRSD:         94                       T:            -11
QT:           452                                    
QTc:          522                                    
                           Interpretive Statements
Sinus rhythm
Prolonged CA interval
Borderline left axis deviation
Abnormal R-wave progression, late transition
Borderline repolarization abnormality
Prolonged QT interval
 
 
Electronically Signed On 2018 15:08:30 CDT by Lamonte Medellin
[email or web address]
 
 
 
 
 
 
 
 
 
 
 
 
 
 
 
  <ELECTRONICALLY SIGNED>
                                           By: Lamonte Medellin MD, [facility]      
  18     1508
D: 07/15/18 1937   _____________________________________
T: 07/15/18 1937   Lamonte Medellin MD, [facility]        /EPI

## 2018-07-16 NOTE — NUR
ASSUMED CARE OF PT ASSESSED AND DOCUMENTED. PT ON CARDIAC MONITER TRACING
A-FIB HR 66. PT IS A&O. VSS WNL. PT IS AFEBRILE. SHE HAS NO C/O N&V. SHE DOES
STATE SHE HAS CHEST PAIN RATES A 6 ON PAIN SCALE BUT REQUIRES NO PAIN MED.PT
ON 3L OF OF STAT 98 SO LOWERED TO 2L. BED IS IN LOW POSITION CALL LIGHT IS IN
REACH. WM.

## 2018-07-16 NOTE — NUR
PT HAS RESTED IN HER ROOM THIS SHIFT. SHE WAS NPO UNTILL APPROX 1550 WHEN I
GAVE HER A BOXED LUNCH. PT IS TO BE NPO AFTER MIDNIGHT FOR MRCP TOMORROW.
EDUCATION GIVEN ON DEMAND. HOURLY ROUNDING COMPLETE.

## 2018-07-16 NOTE — EKG
[place] [address]
Phone:  [phone]                     ELECTROCARDIOGRAM REPORT      
_______________________________________________________________________________
 
Name:       KRISTIN ROMO                  Room:           [address] [place]  
[facility]#:  [id number]      Account #:      [id number]  
Admission:  07/15/18     Attend Phys:    BONY Cobb
Discharge:               Date of Birth:  [birth year]  
         Report #: [id number]
    [id number]
_______________________________________________________________________________
THIS REPORT FOR:  //name//                      
 
                          [facility]
                                       
Test Date:    2018-07-15               Test Time:    16:24:46
Pat Name:     KRISTIN ROMO              Department:   
Patient ID:   [id number]            Room:          
Gender:       F                        Technician:   
:          1932               Requested By: Popeye Lennon
Order Number: [id number] MD:   Lamonte Medellin
                                 Measurements
Intervals                              Axis          
Rate:         89                       P:            120
CA:           256                      QRS:          -19
QRSD:         151                      T:            92
QT:           353                                    
QTc:          430                                    
                           Interpretive Statements
Sinus rhythm
Prolonged CA interval
t wave changes noted
 
 
Electronically Signed On 2018 15:06:26 CDT by Lamonte Medellin
[email or web address]
 
 
 
 
 
 
 
 
 
 
 
 
 
 
 
 
 
 
  <ELECTRONICALLY SIGNED>
                                           By: Lamonte Medellin MD, [facility]      
  18     1506
D: 07/15/18 1624   _____________________________________
T: 07/15/18 1624   Lamonte Medellin MD, FACC        /EPI

## 2018-07-16 NOTE — EKG
[place] [address]
Phone:  [phone]                     ELECTROCARDIOGRAM REPORT      
_______________________________________________________________________________
 
Name:       KRISTIN ROMO                  Room:           [address]    [place]M [place]  
.R.#:  [id number]      Account #:      [id number]  
Admission:  07/15/18     Attend Phys:    BONY Cobb
Discharge:               Date of Birth:  [birth year]  
         Report #: [id number]
    [id number]
_______________________________________________________________________________
THIS REPORT FOR:  //name//                      
 
                         [facility] ED
                                       
Test Date:    2018-07-15               Test Time:    13:21:41
Pat Name:     KRISTIN ROMO              Department:   
Patient ID:   [id number]            Room:         [address]
Gender:       F                        Technician:   WAYNE
:          1932               Requested By: Popeye Lennon
Order Number: [id number]    Reading MD:   Lamonte Medellin
                                 Measurements
Intervals                              Axis          
Rate:         69                       P:            27
ID:           [id number]                      QRS:          -21
QRSD:         106                      T:            81
QT:           458                                    
QTc:          491                                    
                           Interpretive Statements
Sinus rhythm
Borderline prolonged ID interval
Borderline left axis deviation
Repol abnrm suggests ischemia, anterolateral
Compared to ECG 07/10/2017 20:47:12
Possible ischemia now present
 
Electronically Signed On 2018 15:03:49 CDT by Lamonte Medellin
[email or web address]
 
 
 
 
 
 
 
 
 
 
 
 
 
 
 
 
  <ELECTRONICALLY SIGNED>
                                           By: Lamonte Medellin MD, FAC      
  18     1503
D: 07/15/18 1321   _____________________________________
T: 07/15/18 1321   Lamonte Medellin MD, [facility]        /EPI

## 2018-07-16 NOTE — EKG
[place] [address]
Phone:  [phone]                     ELECTROCARDIOGRAM REPORT      
_______________________________________________________________________________
 
Name:       KRISTIN ROMO                  Room:           [address]    ADM IN  
M.R.#:  [id number]      Account #:      [id number]  
Admission:  07/15/18     Attend Phys:    BONY Cobb
Discharge:               Date of Birth:  [birth year]  
         Report #: [id number]
    [id number]
_______________________________________________________________________________
THIS REPORT FOR:  //name//                      
 
                          [facility]
                                       
Test Date:    2018               Test Time:    04:16:17
Pat Name:     KRISTIN ROMO              Department:   
Patient ID:   [id number]            Room:         [address]
Gender:       F                        Technician:   VI
:          1932               Requested By: Barbara Mukherjee
Order Number: [id number]    Angel MD:   Lamonte Medellin
                                 Measurements
Intervals                              Axis          
Rate:         60                       P:            
VA:                                    QRS:          -25
QRSD:         104                      T:            26
QT:           517                                    
QTc:          517                                    
                           Interpretive Statements
sinus rhythm with first degree av block
Borderline left axis deviation
RSR' in V1 or V2, probably normal variant
Borderline repolarization abnormality
Prolonged QT interval
 
 
Electronically Signed On 2018 15:22:16 CDT by Lamonte Medellin
[email or web address]
 
 
 
 
 
 
 
 
 
 
 
 
 
 
 
 
  <ELECTRONICALLY SIGNED>
                                           By: Lamonte Medellin MD, [facility]      
  18     1522
D: 18 0416   _____________________________________
T: 18 0416   Lamonte Medellin MD, [facility]        /EPI

## 2018-07-16 NOTE — NUR
ASSUMED PT CARE AT 19;15 REPORT RECEIVED FORM NURSE, PT IS ALET AWAKE,
ORIENTED X 4 ANXIOUS ABOUT HER HEADACHE. VITAL SIGNS WITHIN NORMAL LIMIT.
TYLENOL WAS ADMINISTERED FOR HEADACHE WHICH HAS SUBSIDES. O2 SATURATIO WAS 92
% ON RA . OXYGEN WAS INITIATED 2L NC PT SATURATION WENT UP TO 94%. 1S DEGREE
AV BLOCK ON THE CARDIAC MONITOR WITH BBB. IV LINE PATENT NS RUNNING AT 75CC/HR
AS ORDERED. ROUTINE EKG PERFORMED RESULT SHOWS SINUS RYTHM. HOSPITALIST ON
CALL PLACED DR ON NPO FOR AFTER MIDNIGHT FOR CARDIOLOGIST CONSULT IN THE
MORNING. DR ADKINS WAS FOUND AND EXPECTING TO SEE PT AT 07:30 AM AS DISCUSSED
WITH ANSWERING SERVICE. ERENDIRA CONTINUE TO MONITOR.

## 2018-07-17 VITALS — SYSTOLIC BLOOD PRESSURE: 152 MMHG | DIASTOLIC BLOOD PRESSURE: 63 MMHG

## 2018-07-17 VITALS — DIASTOLIC BLOOD PRESSURE: 77 MMHG | SYSTOLIC BLOOD PRESSURE: 152 MMHG

## 2018-07-17 VITALS — DIASTOLIC BLOOD PRESSURE: 59 MMHG | SYSTOLIC BLOOD PRESSURE: 138 MMHG

## 2018-07-17 VITALS — DIASTOLIC BLOOD PRESSURE: 71 MMHG | SYSTOLIC BLOOD PRESSURE: 164 MMHG

## 2018-07-17 VITALS — DIASTOLIC BLOOD PRESSURE: 55 MMHG | SYSTOLIC BLOOD PRESSURE: 129 MMHG

## 2018-07-17 VITALS — DIASTOLIC BLOOD PRESSURE: 100 MMHG | SYSTOLIC BLOOD PRESSURE: 144 MMHG

## 2018-07-17 VITALS — SYSTOLIC BLOOD PRESSURE: 164 MMHG | DIASTOLIC BLOOD PRESSURE: 71 MMHG

## 2018-07-17 LAB
ALBUMIN SERPL-MCNC: 2.8 G/DL (ref 3.4–5)
ALP SERPL-CCNC: 239 U/L (ref 46–116)
ALT SERPL-CCNC: 113 U/L (ref 30–65)
AST SERPL-CCNC: 111 U/L (ref 15–37)
BILIRUB DIRECT SERPL-MCNC: 0.2 MG/DL
BILIRUB SERPL-MCNC: 0.5 MG/DL
IRON SERPL-MCNC: 11 UG/DL (ref 50–175)
PROT SERPL-MCNC: 5.9 G/DL (ref 6.4–8.2)
SAO2 % BLD FROM PO2: 4 % (ref 20–39)

## 2018-07-17 PROCEDURE — 0DJ08ZZ INSPECTION OF UPPER INTESTINAL TRACT, VIA NATURAL OR ARTIFICIAL OPENING ENDOSCOPIC: ICD-10-PCS | Performed by: INTERNAL MEDICINE

## 2018-07-17 NOTE — NUR
ASSUMED CARE OF PATIENT THIS AM AT 0730.  PATIENT IS ALERT AND ORIENTED X 4.
SHE DENIES PAIN, NAUSEA AND VOMITING THIS AM.  PATIENT KEPT NPO FOR PROCEDURES
TODAY.  IV FLUID INFUSING /HR.  PATIENT HAS BIBASILAR CRACKLES ON
AULSCULTATION.  PATIENT TAKEN TO RADIOLOGY PER W/C FOR MRI AND RETURNED.  TELE
SHOWS SR WITH A 1DAVB.  WILL CONTINUE TO MONITOR COMFORT AND LAB RESULTS.

## 2018-07-17 NOTE — NUR
ASSUMED PT CARE AT 19:15 REPORT RECEIVED FROM NURSE. PT IS ALERT AWAKE
ORIENTED X4 SINUS RYTHM 1ST DEGREE AV BLOCK ON THE MONITOR. COMPLAIN OF PAIN
IN LOWER BACK. TYLEMOL GIVEN. PAIN SUBSIDES. IV ANTIBIOTIC ADMINISTERED AS
ORDERED. IV FLUID RUNNING AT 100CC.HR. PT ON RA SATURATIONIS 96% NO SOB NOTED.
VITALS ARE WITHIN NORMAL LIMT. SHE IS NPO AFTER MIDNIGHT FOR EGD IN THE AM. PT
HAD EPISODE OF HARD COUGHING TODAY AND SPUTUM WAS THICK WHITE SMALL AMOUNT.
SHE SAID SHE FELT WARM BUT DD NOT HAVE A TEMP. WET COLD COMPRESSED WAS APPLIED
TO HER HEAD AND SIP OF SEVEN UP PROVIDED. PT SAYS SHE FEELS BETTER. SHE IS NOW
LAYING IN BED. BED ALARM ON. WILL CONTINUE TO MONITOR

## 2018-07-17 NOTE — NUR
ASSUMED CARE AT 1920, ASSESSMENT AS CHARTED. PATIENT ALERT/ORIENTED X4,
FORGETFUL AT TIMES, RESTING IN BED. ON TELE, SR. ON ROOM AIR, SOB NOTED WITH
TALKING/EXERTION, SATS 93%. IVF INFUSING TO RIGHT WRIST IV SITE PER MAR. UP
WITH SBA TO BATHROOM. DENIES PAIN OR NEEDS BUT STATES HAVING TENDERNESS OVER
RUQ WHEN PALPATED. DR. COBOS NOTIFIED REGARDING PATIENT BEING SOA, ORDERS
RECEIVED AND NOTED. REFUSING SCD'S. BED ALARM ON. CALL LIGHT WITHIN REACH,
ENCOURAGED TO CALL FOR NEEDS.

## 2018-07-17 NOTE — CON
[address]  [address]                    CONSULTATION                  
_______________________________________________________________________________
 
Name:       KRISTIN ROMO                  Room:           [address] [place]  
.R.#:  [id number]      Account #:      [id number]  
Admission:  07/15/18     Attend Phys:    BONY Cobb
Discharge:               Date of Birth:  [birth year]  
         Report #: [id number]
                                                                     [id number]  
_______________________________________________________________________________
THIS REPORT FOR:  //name//                      
 
CC: Nevin Franco DO
 
DATE OF SERVICE:  07/16/2018
 
 
REQUESTING PHYSICIAN:  Javier Franco DO
 
HISTORY OF PRESENT ILLNESS:  This is an 86-year-old female with acute abdominal
pain, which prompted her to come to hospital.  Since hospitalization, the
patient was found to have cholelithiasis and cholecystitis as she has thickened
gallbladder wall, gallstones, and positive Ratliff sign per imaging.  The patient
also found to be anemic with hemoglobin in range of 9, which is microcytic as
MCV is 77.  She denies any hematochezia or melena.  She has never had any
endoscopic evaluation.  She complains of early satiety, which has been a problem
for her for a long time.  She takes a PPI for GERD.
 
PAST MEDICAL HISTORY:  Significant for history of hypertension, coronary artery
disease status post CABG, bilateral mastectomy, appendectomy, pneumonia,
dyslipidemia, depression, chronic aspirin use, and cholelithiasis.
 
ALLERGIES:  SIGNIFICANT TO PENICILLIN, SULFA AND THE PATIENT HAS LATEX ALLERGY.
 
MEDICATIONS:  Please refer to hospital MAR.
 
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
 
FAMILY HISTORY:  Negative for GI malignancy.
 
PHYSICAL EXAMINATION:
VITAL SIGNS:  Reveals blood pressure of 140/42, respirations 17, pulse 66,
temperature 99.3.
LUNGS:  Clear.
CARDIOVASCULAR:  Regular.
ABDOMEN:  Soft, mildly tender to palpation in the right upper quadrant.  Bowel
sounds are positive.
NEUROLOGIC:  The patient is hard of hearing, but is alert and oriented x 3.
 
LABORATORY DATA:  Reveal sodium of 128, potassium is 3.6, BUN is 20, creatinine
is 2.1, AST is 117, ALT is 45, alkaline phosphatase is 212.  CPK is 517.  INR is
1.1.  WBC is 9.6 with hemoglobin of 9.4 and platelets of 304.  UA reveals wbc's
more than 25 suggestive of UTI.
 
 
 
 
[place] [address]                    CONSULTATION                  
_______________________________________________________________________________
 
Name:       KRISTIN ROMO                  Room:           [address] [place]  
[facility].#:  [id number]      Account #:      [id number]  
Admission:  07/15/18     Attend Phys:    BONY Cobb
Discharge:               Date of Birth:  [birth year]  
         Report #: [id number]
                                                                     [id number]  
_______________________________________________________________________________
IMAGING:  CT of abdomen and pelvis and ultrasound has been obtained and
discussed above.  Note that MRI is pending.
 
ASSESSMENT AND PLAN:  The patient with anemia with a hemoglobin of 9 and MCV of
77.  She has never been scoped and also complains of early satiety.  We will
consider EGD.  She also has cholecystitis and rhabdomyolysis.  Her elevation of
transaminases is suggestive of rhabdo as her ALT is normal and AST is 3 times
ALT.  The patient's alkaline phosphatase is also elevated.  We will consider GGT
and await MRCP results.  Finally, the patient is hyponatremic, her sodium should
be replaced.  We will continue to monitor her during this hospital stay.  I will
make further recommendation after completing her EGD and reviewing her MRCP.
 
 
 
 
 
 
 
 
 
 
 
 
 
 
 
 
 
 
 
 
 
 
 
 
 
 
 
 
 
 
 
 
 
<ELECTRONICALLY SIGNED>
                                        By:  Alison Rodriguez MD            
07/17/18     1659
D: 07/16/18 1559_______________________________________
T: 07/16/18 1613Alison Rodriguez MD               /nt

## 2018-07-18 VITALS — SYSTOLIC BLOOD PRESSURE: 137 MMHG | DIASTOLIC BLOOD PRESSURE: 50 MMHG

## 2018-07-18 VITALS — DIASTOLIC BLOOD PRESSURE: 49 MMHG | SYSTOLIC BLOOD PRESSURE: 134 MMHG

## 2018-07-18 VITALS — DIASTOLIC BLOOD PRESSURE: 65 MMHG | SYSTOLIC BLOOD PRESSURE: 165 MMHG

## 2018-07-18 VITALS — DIASTOLIC BLOOD PRESSURE: 77 MMHG | SYSTOLIC BLOOD PRESSURE: 152 MMHG

## 2018-07-18 VITALS — DIASTOLIC BLOOD PRESSURE: 71 MMHG | SYSTOLIC BLOOD PRESSURE: 164 MMHG

## 2018-07-18 VITALS — SYSTOLIC BLOOD PRESSURE: 165 MMHG | DIASTOLIC BLOOD PRESSURE: 66 MMHG

## 2018-07-18 VITALS — DIASTOLIC BLOOD PRESSURE: 61 MMHG | SYSTOLIC BLOOD PRESSURE: 145 MMHG

## 2018-07-18 LAB
ABSOLUTE MONOCYTES: 0.3 THOU/UL (ref 0–1.2)
ALBUMIN SERPL-MCNC: 2.7 G/DL (ref 3.4–5)
ALP SERPL-CCNC: 217 U/L (ref 46–116)
ALT SERPL-CCNC: 83 U/L (ref 30–65)
ANION GAP SERPL CALC-SCNC: 12 MMOL/L (ref 7–16)
ANION GAP SERPL CALC-SCNC: 12 MMOL/L (ref 7–16)
ANISOCYTOSIS BLD QL SMEAR: (no result)
AST SERPL-CCNC: 43 U/L (ref 15–37)
BILIRUB SERPL-MCNC: 0.6 MG/DL
BUN SERPL-MCNC: 13 MG/DL (ref 7–18)
BUN SERPL-MCNC: 17 MG/DL (ref 7–18)
CALCIUM SERPL-MCNC: 8.1 MG/DL (ref 8.5–10.1)
CALCIUM SERPL-MCNC: 8.7 MG/DL (ref 8.5–10.1)
CHLORIDE SERPL-SCNC: 103 MMOL/L (ref 98–107)
CHLORIDE SERPL-SCNC: 104 MMOL/L (ref 98–107)
CO2 SERPL-SCNC: 19 MMOL/L (ref 21–32)
CO2 SERPL-SCNC: 20 MMOL/L (ref 21–32)
CREAT SERPL-MCNC: 1.5 MG/DL (ref 0.6–1.3)
CREAT SERPL-MCNC: 1.7 MG/DL (ref 0.6–1.3)
GLUCOSE SERPL-MCNC: 107 MG/DL (ref 70–99)
GLUCOSE SERPL-MCNC: 188 MG/DL (ref 70–99)
GRANULOCYTES NFR BLD MANUAL: 95 %
HCT VFR BLD CALC: 22.5 % (ref 37–47)
HCT VFR BLD CALC: 33.7 % (ref 37–47)
HGB BLD-MCNC: 11.4 GM/DL (ref 12–15)
HGB BLD-MCNC: 7.5 GM/DL (ref 12–15)
LYMPHOCYTES # BLD: 0.2 THOU/UL (ref 0.8–5.3)
LYMPHOCYTES NFR BLD AUTO: 2 %
MAGNESIUM SERPL-MCNC: 1.5 MG/DL (ref 1.8–2.4)
MCH RBC QN AUTO: 26 PG (ref 26–34)
MCH RBC QN AUTO: 27.8 PG (ref 26–34)
MCHC RBC AUTO-ENTMCNC: 33.3 G/DL (ref 28–37)
MCHC RBC AUTO-ENTMCNC: 34 G/DL (ref 28–37)
MCV RBC: 78 FL (ref 80–100)
MCV RBC: 81.7 FL (ref 80–100)
MONOCYTES NFR BLD: 3 %
MPV: 7.8 FL. (ref 7.2–11.1)
MPV: 7.9 FL. (ref 7.2–11.1)
NEUTROPHILS # BLD: 9.6 THOU/UL (ref 1.6–8.1)
NUCLEATED RBCS: 0 /100WBC
PLATELET # BLD EST: ADEQUATE 10*3/UL
PLATELET COUNT*: 229 THOU/UL (ref 150–400)
PLATELET COUNT*: 230 THOU/UL (ref 150–400)
POTASSIUM SERPL-SCNC: 3.1 MMOL/L (ref 3.5–5.1)
POTASSIUM SERPL-SCNC: 4.2 MMOL/L (ref 3.5–5.1)
PROT SERPL-MCNC: 5.9 G/DL (ref 6.4–8.2)
RBC # BLD AUTO: 2.89 MIL/UL (ref 4.2–5)
RBC # BLD AUTO: 4.12 MIL/UL (ref 4.2–5)
RDW-CV: 15.7 % (ref 10.5–14.5)
RDW-CV: 16.4 % (ref 10.5–14.5)
SODIUM SERPL-SCNC: 134 MMOL/L (ref 136–145)
SODIUM SERPL-SCNC: 136 MMOL/L (ref 136–145)
WBC # BLD AUTO: 10.1 THOU/UL (ref 4–11)
WBC # BLD AUTO: 8.1 THOU/UL (ref 4–11)

## 2018-07-18 PROCEDURE — 30233N1 TRANSFUSION OF NONAUTOLOGOUS RED BLOOD CELLS INTO PERIPHERAL VEIN, PERCUTANEOUS APPROACH: ICD-10-PCS | Performed by: INTERNAL MEDICINE

## 2018-07-18 PROCEDURE — 0FT44ZZ RESECTION OF GALLBLADDER, PERCUTANEOUS ENDOSCOPIC APPROACH: ICD-10-PCS | Performed by: COLON & RECTAL SURGERY

## 2018-07-18 PROCEDURE — BF131ZZ FLUOROSCOPY OF GALLBLADDER AND BILE DUCTS USING LOW OSMOLAR CONTRAST: ICD-10-PCS

## 2018-07-18 NOTE — NUR
PATIENT RESTING IN BED. REPORT GIVEN TO ONCOMING NURSE. PATIENT NOTED TO HAVE
IMPROVEMENT WITH BREATHING. DR. COBOS NOTIFIED WITH AM LABS, ORDERS RECEIVED.
BED ALARM ON. WILL MONITOR.

## 2018-07-18 NOTE — NUR
DR. COBOS NOTIFIED REGARDING XRAY RESULTS, NEW ORDERS RECEIVED. UP WITH SBA TO
BATHROOM. BED ALARM ON. WILL MONITOR.

## 2018-07-19 VITALS — SYSTOLIC BLOOD PRESSURE: 146 MMHG | DIASTOLIC BLOOD PRESSURE: 65 MMHG

## 2018-07-19 VITALS — DIASTOLIC BLOOD PRESSURE: 71 MMHG | SYSTOLIC BLOOD PRESSURE: 151 MMHG

## 2018-07-19 VITALS — SYSTOLIC BLOOD PRESSURE: 150 MMHG | DIASTOLIC BLOOD PRESSURE: 74 MMHG

## 2018-07-19 VITALS — DIASTOLIC BLOOD PRESSURE: 91 MMHG | SYSTOLIC BLOOD PRESSURE: 186 MMHG

## 2018-07-19 VITALS — DIASTOLIC BLOOD PRESSURE: 67 MMHG | SYSTOLIC BLOOD PRESSURE: 155 MMHG

## 2018-07-19 VITALS — DIASTOLIC BLOOD PRESSURE: 72 MMHG | SYSTOLIC BLOOD PRESSURE: 160 MMHG

## 2018-07-19 VITALS — SYSTOLIC BLOOD PRESSURE: 153 MMHG | DIASTOLIC BLOOD PRESSURE: 68 MMHG

## 2018-07-19 LAB
ALBUMIN SERPL-MCNC: 2.9 G/DL (ref 3.4–5)
ALP SERPL-CCNC: 240 U/L (ref 46–116)
ALT SERPL-CCNC: 87 U/L (ref 30–65)
ANION GAP SERPL CALC-SCNC: 12 MMOL/L (ref 7–16)
AST SERPL-CCNC: 64 U/L (ref 15–37)
BILIRUB SERPL-MCNC: 0.8 MG/DL
BUN SERPL-MCNC: 22 MG/DL (ref 7–18)
CALCIUM SERPL-MCNC: 8.1 MG/DL (ref 8.5–10.1)
CHLORIDE SERPL-SCNC: 104 MMOL/L (ref 98–107)
CO2 SERPL-SCNC: 18 MMOL/L (ref 21–32)
CREAT SERPL-MCNC: 1.8 MG/DL (ref 0.6–1.3)
GLUCOSE SERPL-MCNC: 129 MG/DL (ref 70–99)
HCT VFR BLD CALC: 31.2 % (ref 37–47)
HGB BLD-MCNC: 10.6 GM/DL (ref 12–15)
MAGNESIUM SERPL-MCNC: 1.7 MG/DL (ref 1.8–2.4)
MCH RBC QN AUTO: 27.5 PG (ref 26–34)
MCHC RBC AUTO-ENTMCNC: 33.9 G/DL (ref 28–37)
MCV RBC: 81 FL (ref 80–100)
MPV: 7.8 FL. (ref 7.2–11.1)
PHOSPHATE SERPL-MCNC: 3 MG/DL (ref 2.5–4.9)
PLATELET COUNT*: 235 THOU/UL (ref 150–400)
POTASSIUM SERPL-SCNC: 4.1 MMOL/L (ref 3.5–5.1)
PROT SERPL-MCNC: 6.2 G/DL (ref 6.4–8.2)
RBC # BLD AUTO: 3.86 MIL/UL (ref 4.2–5)
RDW-CV: 15.8 % (ref 10.5–14.5)
SODIUM SERPL-SCNC: 134 MMOL/L (ref 136–145)
WBC # BLD AUTO: 10.3 THOU/UL (ref 4–11)

## 2018-07-19 NOTE — NUR
PATIENT SITTING IN CHAIR MOST OF SHIFT. PRN TYLENOL GIVEN X 1 FOR ABD PAIN.
SURGERY REQUESTING THAT GI ROUND ON PATIENT REGARDING CHOLANGIOGRAM ON 7/18,
SPOKE WITH DR. KOHLER AND HERE ROUNDING ON PATIENT. PATIENT ADVANCED TO FULL
LIQUID DIET THIS EVENING. PATIENT HAD SMALL BM'S X 2. PATIENT CALLING OUT
STATING SHE THOUGHT THERE WAS BLOOD IN STOOL, THIS NURSE OBSERVED STOOL WITH
URINE IN TOILET AND NO BLOOD NOTED. PATIENT URINE ORANGE IN COLOR. DR. KOHLER
AWARE. NPO AFTER MIDNIGHT FOR POSSIBLE ERCP TOMORROW.

## 2018-07-20 VITALS — DIASTOLIC BLOOD PRESSURE: 74 MMHG | SYSTOLIC BLOOD PRESSURE: 169 MMHG

## 2018-07-20 VITALS — DIASTOLIC BLOOD PRESSURE: 71 MMHG | SYSTOLIC BLOOD PRESSURE: 150 MMHG

## 2018-07-20 LAB
ALBUMIN SERPL-MCNC: 2.8 G/DL (ref 3.4–5)
ALP SERPL-CCNC: 195 U/L (ref 46–116)
ALT SERPL-CCNC: 63 U/L (ref 30–65)
ANION GAP SERPL CALC-SCNC: 9 MMOL/L (ref 7–16)
AST SERPL-CCNC: 29 U/L (ref 15–37)
BACTERIA-REFLEX: (no result) /HPF
BILIRUB SERPL-MCNC: 0.6 MG/DL
BILIRUB UR-MCNC: NEGATIVE MG/DL
BUN SERPL-MCNC: 29 MG/DL (ref 7–18)
CALCIUM SERPL-MCNC: 7.8 MG/DL (ref 8.5–10.1)
CHLORIDE SERPL-SCNC: 102 MMOL/L (ref 98–107)
CO2 SERPL-SCNC: 20 MMOL/L (ref 21–32)
COLOR UR: YELLOW
CREAT SERPL-MCNC: 1.6 MG/DL (ref 0.6–1.3)
GLUCOSE SERPL-MCNC: 100 MG/DL (ref 70–99)
HCT VFR BLD CALC: 30.2 % (ref 37–47)
HGB BLD-MCNC: 10.2 GM/DL (ref 12–15)
KETONES UR STRIP-MCNC: NEGATIVE MG/DL
MAGNESIUM SERPL-MCNC: 1.9 MG/DL (ref 1.8–2.4)
MCH RBC QN AUTO: 27.1 PG (ref 26–34)
MCHC RBC AUTO-ENTMCNC: 33.7 G/DL (ref 28–37)
MCV RBC: 80.4 FL (ref 80–100)
MPV: 7.8 FL. (ref 7.2–11.1)
MUCUS: (no result) STRN/LPF
PHOSPHATE SERPL-MCNC: 2.4 MG/DL (ref 2.5–4.9)
PLATELET COUNT*: 240 THOU/UL (ref 150–400)
POTASSIUM SERPL-SCNC: 4 MMOL/L (ref 3.5–5.1)
PROT SERPL-MCNC: 5.5 G/DL (ref 6.4–8.2)
PROT UR QL STRIP: (no result)
RBC # BLD AUTO: 3.75 MIL/UL (ref 4.2–5)
RBC # UR STRIP: NEGATIVE /UL
RBC #/AREA URNS HPF: (no result) /HPF (ref 0–2)
RDW-CV: 16 % (ref 10.5–14.5)
SODIUM SERPL-SCNC: 131 MMOL/L (ref 136–145)
SP GR UR STRIP: 1.02 (ref 1–1.03)
SQUAMOUS: (no result) /LPF (ref 0–3)
URINE CLARITY: CLEAR
URINE GLUCOSE-RANDOM: NEGATIVE
URINE LEUKOCYTES-REFLEX: (no result)
URINE NITRITE-REFLEX: POSITIVE
URINE WBC-REFLEX: (no result) /HPF (ref 0–5)
UROBILINOGEN UR STRIP-ACNC: 0.2 E.U./DL (ref 0.2–1)
WBC # BLD AUTO: 12.3 THOU/UL (ref 4–11)

## 2018-07-20 NOTE — NUR
PATIENT REFUSING ERCP TODAY, SPOKE WITH DR. KOHLER AND OK FOR PATIENT TO
DISCHARGE AND HAVE LFT'S DRAWN IN 1 WEEK. SURGERY OK TO DISCHARGE HOME.
PATIENT TOLERATED LOW FAT DIET. IV DC'D. VERBALIZED UNDERSTANDING OF PAPERWORK
AND SCRIPT FOR UTI ABX. PATIENT TAKEN OUT VIA WHEELCHAIR WITH STAFF AND ALL
BELONINGS.

## 2018-07-20 NOTE — NUR
ASSUMED PT CARE AT 1930, PT IS A&OX4, PT IS FORGETFUL AND CONFUSED AT TIMES.
PT IS TRACING NSR ON THE MONITOR, ON 2L NC SATTING MID TO HIGH 90'S. PT IS ONE
DAY POST OP WITH A LAP JANE. 4 LAP SITES CDI TO ABDOMEN. PT C/O A HEADACHE
ONCE THIS SHIFT. PRN PAIN MEDICATION GIVEN PER MAR. BED IN LOW POSITION, CALL
LIGHT IN REACH, BED ALARM ON, YELLOW ARM BAND AND SOCKS IN PLACE. HOURLY
ROUNING COMPLETED FOR PT SAFETY.

## 2018-07-23 NOTE — CON
[address]  [address]                    CONSULTATION                  
_______________________________________________________________________________
 
Name:       KRISTIN ROMO                  Room:           [address] [place]  
.R.#:  [id number]      Account #:      [id number]  
Admission:  07/15/18     Attend Phys:    BONY Cobb
Discharge:  07/20/18     Date of Birth:  [birth year]  
         Report #: [id number]
                                                                     [id number]  
_______________________________________________________________________________
THIS REPORT FOR:  //name//                      
 
CC: Nevin Franco
 
DATE OF SERVICE:  07/20/2018
 
 
ATTENDING PHYSICIAN:  Dr. Franco.
 
REASON FOR EVALUATION:  Complicated urinary tract infection due to moderately
resistant Escherichia coli.
 
HISTORY OF PRESENT ILLNESS:  Chart reviewed, the patient examined.  This is an
86-year-old with known vasculopathy, coronary artery disease who was admitted
with fairly severe abdominal pain.  She states at that point, she almost passed
out.  She was evaluated and found to have cholecystitis secondary to
cholelithiasis.  She did undergo resuscitative measures 07/18/2018 and underwent
laparoscopic cholecystectomy with intraoperative cholangiogram.  Postop LFTs are
still mildly elevated.  There is some recommendation for ERCP; however, she has
declined.  EGD did show mild hiatal hernia.  It is not entirely clear, but on
07/19/2018, blood cultures, urine culture were collected.  Urine culture now
with growth of Escherichia coli as notably urinalysis did show marked pyuria,
greater than 25 white cells.  She denies significant difficulty with urinating. 
No burning.  No flank pain.  Does have residual right upper quadrant pain.  She
has not been febrile.  She was started empirically on Levaquin, results today
show it is in vitro resistant.
 
ALLERGIES:  TO PENICILLINS AND SULFA, BOTH OF WHICH CAUSES A RASH, ALSO LATEX.
 
CURRENT MEDICATIONS:  Include levofloxacin, cholecalciferol, escitalopram,
pantoprazole, atorvastatin, hydrocodone.
 
PAST MEDICAL HISTORY:  Known coronary artery disease, hypertension, bilateral
breast cancer with mastectomies, appendectomy.
 
SOCIAL HISTORY:  Nonsmoker, no ethanol.
 
FAMILY HISTORY:  Noncontributory.
 
REVIEW OF SYSTEMS:  As above.  Denies any significant pulmonary-related
complaints.  She is on supplemental oxygen, which she states that she is on at
night at her skilled nursing facility.
 
PHYSICAL EXAMINATION:
GENERAL:  Pleasant, alert, cooperative, mildly undernourished.  [place] [address]                    CONSULTATION                  
_______________________________________________________________________________
 
Name:       KRISTIN ROMO                  Room:           [address]    DIS IN  
M.R.#:  [id number]      Account #:      [id number]  
Admission:  07/15/18     Attend Phys:    BONY Cobb
Discharge:  07/20/18     Date of Birth:  [birth year]  
         Report #: [id number]
                                                                     [id number]  
_______________________________________________________________________________
distress.
VITAL SIGNS:  Temperature 98.6, pulse 63, respirations 16, blood pressure
150/71.
SKIN:  Warm, dry, no rashes.
HEENT:  She has got nasal cannula oxygen in place.
NECK:  Supple.
LUNGS:  Few scattered coarse breath sounds.
HEART:  Regular.  Borderline bradycardic.  I do not appreciate a murmur.
ABDOMEN:  Soft, nontender, nondistended.  No CVA tenderness.
GENITOURINARY AND RECTAL:  Deferred.
 
LABORATORY DATA:  Urine culture with greater than 100,000 gram-negative rods
identified as Escherichia coli.  It is in vitro resistant to ampicillin,
cefazolin and quinolones.  Blood culture is sterile thus far.  Electrolytes: 
Sodium 131, potassium 4, chloride 102, bicarbonate is 20, BUN and creatinine 29
and 1.6, glucose of 100, total bilirubin of 0.6, albumin 2.8, total protein of
5.5.  LFTs unremarkable.  CBC:  White count 12.3, H and H 10.2 and 30.2,
platelets of 240.  Urinalysis greater than 25 white cells, greater than 30
bacteria.  Lactic acid of 2.0, most recently on 07/19/2018.  Prealbumin of 12.8.
 
ASSESSMENT AND PLAN:  Complicated urinary tract infection.  At this point, we
did have confirmation of in vitro resistant to the quinolones.  We will adjust
therapy.  Sounds as if she may be discharged.  Treat for perhaps 7 days.  This
prescription being written.
 
Thank you.  We will follow as needed.
 
 
 
 
 
 
 
 
 
 
 
 
 
 
 
 
 
 
<ELECTRONICALLY SIGNED>
                                        By:  Wan Escoto MD           
07/23/18     0752
D: 07/20/18 0932_______________________________________
T: 07/20/18 1232Joobed Escoto MD              /nt

## 2018-07-23 NOTE — PATH
[address]  [address]                    PATHOLOGY RPT PROCEDURE       
_______________________________________________________________________________
 
Name:       KRISTIN ROMO                  Room:           [address] [place]  
M.R.#:  [id number]      Account #:      [id number]  
Admission:  07/15/18     Date of Birth:  [birth year]  
Discharge:  07/20/18                   Report #:    [id number]
                                                         Path Case #: [id number]
_______________________________________________________________________________
 
[facility] Accession Number: [id number]
.                                                                01
Material submitted:                                        .
GALLBLADDER
.                                                                01
Clinical history:                                          .
Abdominal pain, cholecystitis, cholelithiasis
.                                                                02
**********************************************************************
Diagnosis:
Gallbladder:
- Chronic follicular cholecystitis and cholelithiasis.
.
(JOHNNY:mml; 7/20/18)
[facility]/07/20/2018
**********************************************************************
.                                                                02
Electronically signed:                                     .
Patel Gunter MD, Pathologist
NPI- [id number]
.                                                                01
Gross description:                                         .
Received in formalin labeled "Kristin [place], gallbladder" is a glistening,
tan, pink, markedly hemorrhagic, collapsed, and intact gallbladder(8.6 x
4.0 x 2.2 cm).  The lumen contains 2 mL thick tenacious yellow-green bile
and a few "sand-like" black calculi, 0.4 x 0.3 x 0.3 cm in aggregate.  The
wall is pliable with thickness ranging from 0.1 cm-0.3 cm.  The mucosa is
diffusely finely granular, slightly roughened, green, and hyperemic.
Representative sections are submitted as A1.
(JENNIFER; 7/19/2018)
JBR/JBR
.                                                                02
Pathologist provided ICD-10:
K80.10
.                                                                02
CPT                                                        .
884847
***Performed at:  01
   [address], [place]  [id number]
   MD Vince Alvares MD Phone:  [phone]
***Performed at:  02
   [facility]
   [address], [place], [place]  [id number]
   MD Patel Gunter MD Phone:  [phone]

## 2018-09-02 ENCOUNTER — HOSPITAL ENCOUNTER (INPATIENT)
Dept: HOSPITAL 96 - M.ERS | Age: 83
LOS: 5 days | Discharge: NURSING, REHAB OR LONG-TERM CARE | DRG: 871 | End: 2018-09-07
Attending: FAMILY MEDICINE | Admitting: FAMILY MEDICINE
Payer: MEDICARE

## 2018-09-02 VITALS — SYSTOLIC BLOOD PRESSURE: 163 MMHG | DIASTOLIC BLOOD PRESSURE: 83 MMHG

## 2018-09-02 VITALS — DIASTOLIC BLOOD PRESSURE: 61 MMHG | SYSTOLIC BLOOD PRESSURE: 128 MMHG

## 2018-09-02 VITALS — SYSTOLIC BLOOD PRESSURE: 168 MMHG | DIASTOLIC BLOOD PRESSURE: 72 MMHG

## 2018-09-02 VITALS — SYSTOLIC BLOOD PRESSURE: 129 MMHG | DIASTOLIC BLOOD PRESSURE: 62 MMHG

## 2018-09-02 VITALS — DIASTOLIC BLOOD PRESSURE: 82 MMHG | SYSTOLIC BLOOD PRESSURE: 155 MMHG

## 2018-09-02 VITALS — SYSTOLIC BLOOD PRESSURE: 159 MMHG | DIASTOLIC BLOOD PRESSURE: 81 MMHG

## 2018-09-02 VITALS — SYSTOLIC BLOOD PRESSURE: 135 MMHG | DIASTOLIC BLOOD PRESSURE: 109 MMHG

## 2018-09-02 VITALS — DIASTOLIC BLOOD PRESSURE: 60 MMHG | SYSTOLIC BLOOD PRESSURE: 119 MMHG

## 2018-09-02 VITALS — BODY MASS INDEX: 25.67 KG/M2 | HEIGHT: 57 IN | WEIGHT: 119 LBS

## 2018-09-02 VITALS — DIASTOLIC BLOOD PRESSURE: 96 MMHG | SYSTOLIC BLOOD PRESSURE: 121 MMHG

## 2018-09-02 VITALS — DIASTOLIC BLOOD PRESSURE: 70 MMHG | SYSTOLIC BLOOD PRESSURE: 123 MMHG

## 2018-09-02 DIAGNOSIS — G93.40: ICD-10-CM

## 2018-09-02 DIAGNOSIS — Z91.040: ICD-10-CM

## 2018-09-02 DIAGNOSIS — I45.81: ICD-10-CM

## 2018-09-02 DIAGNOSIS — Z88.2: ICD-10-CM

## 2018-09-02 DIAGNOSIS — Z95.1: ICD-10-CM

## 2018-09-02 DIAGNOSIS — Z79.899: ICD-10-CM

## 2018-09-02 DIAGNOSIS — K92.1: ICD-10-CM

## 2018-09-02 DIAGNOSIS — K21.9: ICD-10-CM

## 2018-09-02 DIAGNOSIS — N39.0: ICD-10-CM

## 2018-09-02 DIAGNOSIS — Z90.13: ICD-10-CM

## 2018-09-02 DIAGNOSIS — I50.9: ICD-10-CM

## 2018-09-02 DIAGNOSIS — F03.90: ICD-10-CM

## 2018-09-02 DIAGNOSIS — Z80.0: ICD-10-CM

## 2018-09-02 DIAGNOSIS — D64.9: ICD-10-CM

## 2018-09-02 DIAGNOSIS — Z88.0: ICD-10-CM

## 2018-09-02 DIAGNOSIS — E11.22: ICD-10-CM

## 2018-09-02 DIAGNOSIS — Z90.49: ICD-10-CM

## 2018-09-02 DIAGNOSIS — I25.10: ICD-10-CM

## 2018-09-02 DIAGNOSIS — K25.4: ICD-10-CM

## 2018-09-02 DIAGNOSIS — M31.9: ICD-10-CM

## 2018-09-02 DIAGNOSIS — I13.0: ICD-10-CM

## 2018-09-02 DIAGNOSIS — E87.6: ICD-10-CM

## 2018-09-02 DIAGNOSIS — R57.1: ICD-10-CM

## 2018-09-02 DIAGNOSIS — N17.9: ICD-10-CM

## 2018-09-02 DIAGNOSIS — I21.4: ICD-10-CM

## 2018-09-02 DIAGNOSIS — Z87.440: ICD-10-CM

## 2018-09-02 DIAGNOSIS — E53.8: ICD-10-CM

## 2018-09-02 DIAGNOSIS — K57.33: ICD-10-CM

## 2018-09-02 DIAGNOSIS — N18.9: ICD-10-CM

## 2018-09-02 DIAGNOSIS — Z87.01: ICD-10-CM

## 2018-09-02 DIAGNOSIS — Z79.82: ICD-10-CM

## 2018-09-02 DIAGNOSIS — A41.9: Primary | ICD-10-CM

## 2018-09-02 DIAGNOSIS — Z85.3: ICD-10-CM

## 2018-09-02 DIAGNOSIS — K44.9: ICD-10-CM

## 2018-09-02 DIAGNOSIS — E78.5: ICD-10-CM

## 2018-09-02 LAB
ABSOLUTE BASOPHILS: 0 THOU/UL (ref 0–0.2)
ABSOLUTE EOSINOPHILS: 0 THOU/UL (ref 0–0.7)
ABSOLUTE MONOCYTES: 0.1 THOU/UL (ref 0–1.2)
ABSOLUTE MONOCYTES: 0.7 THOU/UL (ref 0–1.2)
ALBUMIN SERPL-MCNC: 3.3 G/DL (ref 3.4–5)
ALP SERPL-CCNC: 66 U/L (ref 46–116)
ALT SERPL-CCNC: 11 U/L (ref 30–65)
ANION GAP SERPL CALC-SCNC: 11 MMOL/L (ref 7–16)
ANION GAP SERPL CALC-SCNC: 6 MMOL/L (ref 7–16)
APTT BLD: 18.9 SECONDS (ref 25–31.3)
AST SERPL-CCNC: 14 U/L (ref 15–37)
BASOPHILS NFR BLD AUTO: 0.3 %
BE: -2.7 MMOL/L
BILIRUB SERPL-MCNC: 0.4 MG/DL
BILIRUB UR-MCNC: NEGATIVE MG/DL
BUN SERPL-MCNC: 43 MG/DL (ref 7–18)
BUN SERPL-MCNC: 62 MG/DL (ref 7–18)
CALCIUM SERPL-MCNC: 7.7 MG/DL (ref 8.5–10.1)
CALCIUM SERPL-MCNC: 9 MG/DL (ref 8.5–10.1)
CHLORIDE SERPL-SCNC: 101 MMOL/L (ref 98–107)
CHLORIDE SERPL-SCNC: 110 MMOL/L (ref 98–107)
CO2 SERPL-SCNC: 22 MMOL/L (ref 21–32)
CO2 SERPL-SCNC: 23 MMOL/L (ref 21–32)
COLOR UR: YELLOW
CREAT SERPL-MCNC: 1.2 MG/DL (ref 0.6–1.3)
CREAT SERPL-MCNC: 1.4 MG/DL (ref 0.6–1.3)
EOSINOPHIL NFR BLD: 0.1 %
GLUCOSE SERPL-MCNC: 103 MG/DL (ref 70–99)
GLUCOSE SERPL-MCNC: 155 MG/DL (ref 70–99)
GRANULOCYTES NFR BLD MANUAL: 82.4 %
GRANULOCYTES NFR BLD MANUAL: 90 %
HCO3 BLD-SCNC: 20.6 MMOL/L (ref 22–26)
HCT VFR BLD CALC: 22.2 % (ref 37–47)
HCT VFR BLD CALC: 22.8 % (ref 37–47)
HCT VFR BLD CALC: 26.7 % (ref 37–47)
HGB BLD-MCNC: 7.5 GM/DL (ref 12–15)
HGB BLD-MCNC: 7.7 GM/DL (ref 12–15)
HGB BLD-MCNC: 9 GM/DL (ref 12–15)
INR PPP: 1
KETONES UR STRIP-MCNC: NEGATIVE MG/DL
LIPASE: 94 U/L (ref 73–393)
LYMPHOCYTES # BLD: 0.9 THOU/UL (ref 0.8–5.3)
LYMPHOCYTES # BLD: 1 THOU/UL (ref 0.8–5.3)
LYMPHOCYTES NFR BLD AUTO: 8 %
LYMPHOCYTES NFR BLD AUTO: 9.3 %
MAGNESIUM SERPL-MCNC: 1.6 MG/DL (ref 1.8–2.4)
MCH RBC QN AUTO: 27.8 PG (ref 26–34)
MCH RBC QN AUTO: 28 PG (ref 26–34)
MCH RBC QN AUTO: 28.2 PG (ref 26–34)
MCHC RBC AUTO-ENTMCNC: 33.8 G/DL (ref 28–37)
MCV RBC: 82.4 FL (ref 80–100)
MCV RBC: 83 FL (ref 80–100)
MCV RBC: 83.3 FL (ref 80–100)
MONOCYTES NFR BLD: 1 %
MONOCYTES NFR BLD: 7.9 %
MPV: 7.7 FL. (ref 7.2–11.1)
MPV: 7.9 FL. (ref 7.2–11.1)
MPV: 8.1 FL. (ref 7.2–11.1)
MUCUS: (no result) STRN/LPF
NEUTROPHILS # BLD: 7.6 THOU/UL (ref 1.6–8.1)
NEUTROPHILS # BLD: 9.7 THOU/UL (ref 1.6–8.1)
NUCLEATED RBCS: 0 /100WBC
NUCLEATED RBCS: 0 /100WBC
PCO2 BLD: 30.3 MMHG (ref 35–45)
PHOSPHATE SERPL-MCNC: 3.3 MG/DL (ref 2.5–4.9)
PLATELET # BLD EST: ADEQUATE 10*3/UL
PLATELET COUNT*: 179 THOU/UL (ref 150–400)
PLATELET COUNT*: 263 THOU/UL (ref 150–400)
PLATELET COUNT*: 263 THOU/UL (ref 150–400)
PO2 BLD: 71.7 MMHG (ref 75–100)
POTASSIUM SERPL-SCNC: 3.3 MMOL/L (ref 3.5–5.1)
POTASSIUM SERPL-SCNC: 3.8 MMOL/L (ref 3.5–5.1)
PROT SERPL-MCNC: 6.7 G/DL (ref 6.4–8.2)
PROT UR QL STRIP: (no result)
PROTHROMBIN TIME: 10.2 SECONDS (ref 9.2–11.5)
RBC # BLD AUTO: 2.69 MIL/UL (ref 4.2–5)
RBC # BLD AUTO: 2.74 MIL/UL (ref 4.2–5)
RBC # BLD AUTO: 3.22 MIL/UL (ref 4.2–5)
RBC # UR STRIP: (no result) /UL
RBC MORPH BLD: NORMAL
RDW-CV: 17.7 % (ref 10.5–14.5)
RDW-CV: 18.4 % (ref 10.5–14.5)
RDW-CV: 18.5 % (ref 10.5–14.5)
SODIUM SERPL-SCNC: 134 MMOL/L (ref 136–145)
SODIUM SERPL-SCNC: 139 MMOL/L (ref 136–145)
SP GR UR STRIP: 1.01 (ref 1–1.03)
SQUAMOUS: (no result) /LPF (ref 0–3)
TROPONIN-I LEVEL: <0.06 NG/ML (ref ?–0.06)
URINE CLARITY: CLEAR
URINE GLUCOSE-RANDOM: NEGATIVE
URINE LEUKOCYTES-REFLEX: NEGATIVE
URINE NITRITE-REFLEX: NEGATIVE
URINE WBC-REFLEX: (no result) /HPF (ref 0–5)
UROBILINOGEN UR STRIP-ACNC: 0.2 E.U./DL (ref 0.2–1)
VARIANT LYMPHS NFR BLD MANUAL: 1 %
WBC # BLD AUTO: 10.8 THOU/UL (ref 4–11)
WBC # BLD AUTO: 11.2 THOU/UL (ref 4–11)
WBC # BLD AUTO: 9.2 THOU/UL (ref 4–11)

## 2018-09-03 VITALS — DIASTOLIC BLOOD PRESSURE: 57 MMHG | SYSTOLIC BLOOD PRESSURE: 117 MMHG

## 2018-09-03 VITALS — SYSTOLIC BLOOD PRESSURE: 101 MMHG | DIASTOLIC BLOOD PRESSURE: 49 MMHG

## 2018-09-03 VITALS — DIASTOLIC BLOOD PRESSURE: 51 MMHG | SYSTOLIC BLOOD PRESSURE: 111 MMHG

## 2018-09-03 VITALS — SYSTOLIC BLOOD PRESSURE: 97 MMHG | DIASTOLIC BLOOD PRESSURE: 49 MMHG

## 2018-09-03 VITALS — DIASTOLIC BLOOD PRESSURE: 56 MMHG | SYSTOLIC BLOOD PRESSURE: 128 MMHG

## 2018-09-03 VITALS — SYSTOLIC BLOOD PRESSURE: 150 MMHG | DIASTOLIC BLOOD PRESSURE: 77 MMHG

## 2018-09-03 VITALS — SYSTOLIC BLOOD PRESSURE: 131 MMHG | DIASTOLIC BLOOD PRESSURE: 60 MMHG

## 2018-09-03 VITALS — SYSTOLIC BLOOD PRESSURE: 120 MMHG | DIASTOLIC BLOOD PRESSURE: 54 MMHG

## 2018-09-03 VITALS — SYSTOLIC BLOOD PRESSURE: 106 MMHG | DIASTOLIC BLOOD PRESSURE: 45 MMHG

## 2018-09-03 VITALS — DIASTOLIC BLOOD PRESSURE: 63 MMHG | SYSTOLIC BLOOD PRESSURE: 102 MMHG

## 2018-09-03 VITALS — DIASTOLIC BLOOD PRESSURE: 63 MMHG | SYSTOLIC BLOOD PRESSURE: 135 MMHG

## 2018-09-03 LAB
ABSOLUTE BASOPHILS: 0.1 THOU/UL (ref 0–0.2)
ABSOLUTE EOSINOPHILS: 0.1 THOU/UL (ref 0–0.7)
ABSOLUTE MONOCYTES: 0.6 THOU/UL (ref 0–1.2)
ALBUMIN SERPL-MCNC: 2.5 G/DL (ref 3.4–5)
ALP SERPL-CCNC: 46 U/L (ref 46–116)
ALT SERPL-CCNC: 12 U/L (ref 30–65)
ANION GAP SERPL CALC-SCNC: 10 MMOL/L (ref 7–16)
ANION GAP SERPL CALC-SCNC: 10 MMOL/L (ref 7–16)
ANION GAP SERPL CALC-SCNC: 5 MMOL/L (ref 7–16)
ANION GAP SERPL CALC-SCNC: 8 MMOL/L (ref 7–16)
AST SERPL-CCNC: 46 U/L (ref 15–37)
BASOPHILS NFR BLD AUTO: 0.6 %
BILIRUB SERPL-MCNC: 0.3 MG/DL
BUN SERPL-MCNC: 29 MG/DL (ref 7–18)
BUN SERPL-MCNC: 33 MG/DL (ref 7–18)
BUN SERPL-MCNC: 37 MG/DL (ref 7–18)
BUN SERPL-MCNC: 37 MG/DL (ref 7–18)
CALCIUM SERPL-MCNC: 7.8 MG/DL (ref 8.5–10.1)
CALCIUM SERPL-MCNC: 7.9 MG/DL (ref 8.5–10.1)
CALCIUM SERPL-MCNC: 7.9 MG/DL (ref 8.5–10.1)
CALCIUM SERPL-MCNC: 8 MG/DL (ref 8.5–10.1)
CHLORIDE SERPL-SCNC: 108 MMOL/L (ref 98–107)
CHLORIDE SERPL-SCNC: 109 MMOL/L (ref 98–107)
CHLORIDE SERPL-SCNC: 110 MMOL/L (ref 98–107)
CHLORIDE SERPL-SCNC: 111 MMOL/L (ref 98–107)
CO2 SERPL-SCNC: 19 MMOL/L (ref 21–32)
CO2 SERPL-SCNC: 21 MMOL/L (ref 21–32)
CO2 SERPL-SCNC: 22 MMOL/L (ref 21–32)
CO2 SERPL-SCNC: 23 MMOL/L (ref 21–32)
CREAT SERPL-MCNC: 1.2 MG/DL (ref 0.6–1.3)
CREAT SERPL-MCNC: 1.3 MG/DL (ref 0.6–1.3)
EOSINOPHIL NFR BLD: 0.6 %
GLUCOSE SERPL-MCNC: 102 MG/DL (ref 70–99)
GLUCOSE SERPL-MCNC: 103 MG/DL (ref 70–99)
GLUCOSE SERPL-MCNC: 103 MG/DL (ref 70–99)
GLUCOSE SERPL-MCNC: 130 MG/DL (ref 70–99)
GRANULOCYTES NFR BLD MANUAL: 82.9 %
HCT VFR BLD CALC: 23.7 % (ref 37–47)
HCT VFR BLD CALC: 23.8 % (ref 37–47)
HCT VFR BLD CALC: 24 % (ref 37–47)
HCT VFR BLD CALC: 24.1 % (ref 37–47)
HGB BLD-MCNC: 8 GM/DL (ref 12–15)
HGB BLD-MCNC: 8 GM/DL (ref 12–15)
HGB BLD-MCNC: 8.1 GM/DL (ref 12–15)
HGB BLD-MCNC: 8.2 GM/DL (ref 12–15)
IRON SERPL-MCNC: 65 UG/DL (ref 50–175)
LYMPHOCYTES # BLD: 0.9 THOU/UL (ref 0.8–5.3)
LYMPHOCYTES NFR BLD AUTO: 9.6 %
MAGNESIUM SERPL-MCNC: 2 MG/DL (ref 1.8–2.4)
MCH RBC QN AUTO: 28.5 PG (ref 26–34)
MCH RBC QN AUTO: 28.5 PG (ref 26–34)
MCH RBC QN AUTO: 28.7 PG (ref 26–34)
MCHC RBC AUTO-ENTMCNC: 33.6 G/DL (ref 28–37)
MCHC RBC AUTO-ENTMCNC: 33.8 G/DL (ref 28–37)
MCHC RBC AUTO-ENTMCNC: 33.8 G/DL (ref 28–37)
MCV RBC: 84.2 FL (ref 80–100)
MCV RBC: 84.8 FL (ref 80–100)
MCV RBC: 85 FL (ref 80–100)
MONOCYTES NFR BLD: 6.3 %
MPV: 8 FL. (ref 7.2–11.1)
MPV: 8.1 FL. (ref 7.2–11.1)
MPV: 8.3 FL. (ref 7.2–11.1)
NEUTROPHILS # BLD: 7.8 THOU/UL (ref 1.6–8.1)
NUCLEATED RBCS: 0 /100WBC
PLATELET COUNT*: 168 THOU/UL (ref 150–400)
PLATELET COUNT*: 170 THOU/UL (ref 150–400)
PLATELET COUNT*: 174 THOU/UL (ref 150–400)
POTASSIUM SERPL-SCNC: 3.1 MMOL/L (ref 3.5–5.1)
POTASSIUM SERPL-SCNC: 3.3 MMOL/L (ref 3.5–5.1)
POTASSIUM SERPL-SCNC: 4.1 MMOL/L (ref 3.5–5.1)
POTASSIUM SERPL-SCNC: 4.3 MMOL/L (ref 3.5–5.1)
PROT SERPL-MCNC: 5 G/DL (ref 6.4–8.2)
RBC # BLD AUTO: 2.78 MIL/UL (ref 4.2–5)
RBC # BLD AUTO: 2.84 MIL/UL (ref 4.2–5)
RBC # BLD AUTO: 2.86 MIL/UL (ref 4.2–5)
RDW-CV: 17.6 % (ref 10.5–14.5)
RDW-CV: 17.7 % (ref 10.5–14.5)
RDW-CV: 17.9 % (ref 10.5–14.5)
SAO2 % BLD FROM PO2: 31 % (ref 20–39)
SODIUM SERPL-SCNC: 137 MMOL/L (ref 136–145)
SODIUM SERPL-SCNC: 139 MMOL/L (ref 136–145)
SODIUM SERPL-SCNC: 139 MMOL/L (ref 136–145)
SODIUM SERPL-SCNC: 141 MMOL/L (ref 136–145)
TROPONIN-I LEVEL: 5.56 NG/ML (ref ?–0.06)
WBC # BLD AUTO: 9.4 THOU/UL (ref 4–11)
WBC # BLD AUTO: 9.5 THOU/UL (ref 4–11)
WBC # BLD AUTO: 9.8 THOU/UL (ref 4–11)

## 2018-09-03 NOTE — EKG
[place] [address]
Phone:  [phone]                     ELECTROCARDIOGRAM REPORT      
_______________________________________________________________________________
 
Name:       KRISTIN ROMO                  Room:           [address]    ADM IN  
M.R.#:  [id number]      Account #:      [id number]  
Admission:  18     Attend Phys:    Matt Guadarrama, 
Discharge:               Date of Birth:  [birth year]  
         Report #: [id number]
    [id number]
_______________________________________________________________________________
THIS REPORT FOR:  //name//                      
 
                         [facility] ED
                                       
Test Date:    2018               Test Time:    08:31:38
Pat Name:     KRISTIN ROMO              Department:   
Patient ID:   [id number]            Room:         [facility]
Gender:       F                        Technician:   TRISTIAN
:          1932               Requested By: Estefany Rosa
Order Number: [id number] MD:   Christopher Telles
                                 Measurements
Intervals                              Axis          
Rate:         107                      P:            
NM:                                    QRS:          -24
QRSD:         83                       T:            141
QT:           367                                    
QTc:          490                                    
                           Interpretive Statements
Atrial fibrillation
Borderline left axis deviation
Repol abnrm suggests ischemia, anterolateral
Compared to ECG 2018 04:16:17
Possible ischemia now present
Sinus rhythm no longer present
First degree AV block no longer present
Prolonged QT interval no longer present
 
Electronically Signed On 9-3-2018 10:46:41 CDT by Christopher Telles
[email or web address]
 
 
 
 
 
 
 
 
 
 
 
 
 
 
  <ELECTRONICALLY SIGNED>
                                           By: Christopher Telles MD, [facility]     
  18     1046
D: 1831   _____________________________________
T: 1831   Christopher Telles MD, FAC       /EPI

## 2018-09-03 NOTE — NUR
PT. PROGRESSING TOWARDS GOALS.  HGB 8.2.  EGD TO BE DONE THIS A.M. AT 0800.
NPO SINCE MIDNIGHT.  MG+ REPLACED, POTASSIUM REPLACING AT THIS TIME.  CALL
LIGHT IN REACH, WILL CONTINUE TO MONITOR.

## 2018-09-03 NOTE — NUR
PT REQUESTING TO HAVE LEADS, BP AND FINGER PROBE REMOVED.  SHE STATED IT MAKES
IT DIFFICULT TO GET UP THE THE COMODE AND BACK TO BED.  I INFORMED THE PATIENT
THAT UNFORTUNATELY SHE WILL NEED TO KEEP THEM ON WHILE SHE IS IN ICU.  SHE HAS
REQUESTED TO BE PLACED IN TELE.  LEFT A MESSAGE WITH DR. MCKEON'S STUDENT FOR
STATUS CHANGE TO TELE.

## 2018-09-03 NOTE — NUR
PT PROGRESSING TOWARD GOALS.  PT ABLE TO AMUBLATE TO CHAIR WITH LITTLE
ASSISTANCE.  PT COMPLAINS SHE IS NOT HUNGRY AND HAS EATEN VERY LITTLE.  PT
REQUESTING MEDICATION TO ASSIST WITH SLEEP THIS EVENING.

## 2018-09-04 VITALS — DIASTOLIC BLOOD PRESSURE: 52 MMHG | SYSTOLIC BLOOD PRESSURE: 94 MMHG

## 2018-09-04 VITALS — SYSTOLIC BLOOD PRESSURE: 143 MMHG | DIASTOLIC BLOOD PRESSURE: 66 MMHG

## 2018-09-04 VITALS — DIASTOLIC BLOOD PRESSURE: 56 MMHG | SYSTOLIC BLOOD PRESSURE: 95 MMHG

## 2018-09-04 VITALS — DIASTOLIC BLOOD PRESSURE: 55 MMHG | SYSTOLIC BLOOD PRESSURE: 91 MMHG

## 2018-09-04 VITALS — SYSTOLIC BLOOD PRESSURE: 114 MMHG | DIASTOLIC BLOOD PRESSURE: 54 MMHG

## 2018-09-04 VITALS — SYSTOLIC BLOOD PRESSURE: 154 MMHG | DIASTOLIC BLOOD PRESSURE: 86 MMHG

## 2018-09-04 VITALS — DIASTOLIC BLOOD PRESSURE: 87 MMHG | SYSTOLIC BLOOD PRESSURE: 156 MMHG

## 2018-09-04 VITALS — DIASTOLIC BLOOD PRESSURE: 59 MMHG | SYSTOLIC BLOOD PRESSURE: 129 MMHG

## 2018-09-04 LAB
ABSOLUTE BASOPHILS: 0 THOU/UL (ref 0–0.2)
ABSOLUTE EOSINOPHILS: 0.1 THOU/UL (ref 0–0.7)
ABSOLUTE MONOCYTES: 0.9 THOU/UL (ref 0–1.2)
ANION GAP SERPL CALC-SCNC: 9 MMOL/L (ref 7–16)
BASOPHILS NFR BLD AUTO: 0.2 %
BUN SERPL-MCNC: 26 MG/DL (ref 7–18)
CALCIUM SERPL-MCNC: 7.7 MG/DL (ref 8.5–10.1)
CHLORIDE SERPL-SCNC: 108 MMOL/L (ref 98–107)
CO2 SERPL-SCNC: 18 MMOL/L (ref 21–32)
CREAT SERPL-MCNC: 1.4 MG/DL (ref 0.6–1.3)
EOSINOPHIL NFR BLD: 1.1 %
GLUCOSE SERPL-MCNC: 129 MG/DL (ref 70–99)
GRANULOCYTES NFR BLD MANUAL: 81.9 %
HCT VFR BLD CALC: 22.7 % (ref 37–47)
HGB BLD-MCNC: 7.5 GM/DL (ref 12–15)
LYMPHOCYTES # BLD: 0.6 THOU/UL (ref 0.8–5.3)
LYMPHOCYTES NFR BLD AUTO: 6.8 %
MCH RBC QN AUTO: 28.6 PG (ref 26–34)
MCHC RBC AUTO-ENTMCNC: 33.3 G/DL (ref 28–37)
MCV RBC: 85.8 FL (ref 80–100)
MONOCYTES NFR BLD: 10 %
MPV: 8 FL. (ref 7.2–11.1)
NEUTROPHILS # BLD: 7.5 THOU/UL (ref 1.6–8.1)
NUCLEATED RBCS: 0 /100WBC
PLATELET COUNT*: 146 THOU/UL (ref 150–400)
POTASSIUM SERPL-SCNC: 3.8 MMOL/L (ref 3.5–5.1)
RBC # BLD AUTO: 2.64 MIL/UL (ref 4.2–5)
RDW-CV: 17.4 % (ref 10.5–14.5)
SODIUM SERPL-SCNC: 135 MMOL/L (ref 136–145)
WBC # BLD AUTO: 9.1 THOU/UL (ref 4–11)

## 2018-09-04 NOTE — NUR
PT CURRENTLY RESTING IN BED- CARDIAC MONITOR IN PLACE AS ORDERED, TRACING SR-
LEFT CHEST PICC IN PLACE, IVF INFUSSING AS PRESCRIBED- IV CIPRO NOTED TO BE
D/C'D THIS SHIFT PER  THIS SHIFT WITH CEFEPIME BID IV ORDERED AND
GIVEN- PT NOTED TO VERY ANXIOUIS AT TIMES, INDECISIVE WITH DECISSION MAKING
AND FIGUEROA AT TIMES- PT C/O X1 OF FEELLING SLIGHT DISCOMFORT TO CHEST WITH HARD
TIME BREATHING- VS NOTED TO /87 96-HR 97.6 AND 97.6 TEMP- THIS NURSE TRY
TO GIVE EDUCATION TO PT ON ANXIETY, WHEN PT SISTER STATES "HOW ARE YOU GOING
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
GO THERE"-  UPDATED ON PT ASSESSMENT FINDINGS ALONG WITH COMPLAINTS-
ORDERS NOTED FOR D-DIMMER AND BLE US DOPPLER- D-DIMER NOTED TO BE WNL OF 0.43
AND US NEGATIVE FOR DVT- ECHO COMPLETED THIS SHIFT, RESULTS NOTE 50-55% LVEF-
PT REPORTING TO OTHER STAFF THAT 2 MEN LAST NIGHT TOOK HER TO THE SMOKE ROOM,
POSSIBLE HALLUCINATIONS, CONFUSSION NOTED- GI HERE TO SEE, COLONOSCOPY ON HOLD
RIGHT NOW WITH PLANS OP IN 2 WEEKS-PT OFFERED TO GET UP TO BED SIDE RECLINER,
BUT REFUSSES- POOR PO INTAKE NOTED WITH MEALS, ALTERNATIVES OFFERED AND
REFUSSED PER PT- CALL LIGHT AND PERSONAL BELONGINGS WITH IN REACH- BED ALARM
IN PLACE AND WORKING FOR PT SAFETY- ALL NEEDS MET AT THIS TIME-[facility]

## 2018-09-04 NOTE — NUR
PT TO FLOOR APROX 0600 AND ASSUMED CARE. PT BELONGINGS IN ROOM. SR ON THE
MONITOR. VITALS WNL. PT COMFORTABLE IN BED.

## 2018-09-04 NOTE — EKG
[place] [address]
Phone:  [phone]                     ELECTROCARDIOGRAM REPORT      
_______________________________________________________________________________
 
Name:       KRISTIN ROMO                  Room:           [address]    ADM IN  
.R.#:  [id number]      Account #:      [id number]  
Admission:  18     Attend Phys:    Matt Guadarrama, 
Discharge:               Date of Birth:  [birth year]  
         Report #: [id number]
    [id number]
_______________________________________________________________________________
THIS REPORT FOR:  //name//                      
 
                         [facility] ED
                                       
Test Date:    2018               Test Time:    11:56:02
Pat Name:     KRISTIN ROMO              Department:   
Patient ID:   [id number]            Room:         [facility]
Gender:       F                        Technician:   MS
:          1932               Requested By: Estefany Rosa
Order Number: [id number] MD:   Christopher Telles
                                 Measurements
Intervals                              Axis          
Rate:         88                       P:            84
ME:           190                      QRS:          -24
QRSD:         80                       T:            -23
QT:           457                                    
QTc:          553                                    
                           Interpretive Statements
Sinus rhythm
Borderline left axis deviation
RSR' in V1 or V2, probably normal variant
Borderline repolarization abnormality
Prolonged QT interval
Baseline wander in lead(s) V6
Compared to ECG 2018 08:31:38
RSR' in V1 or V2 now present
Prolonged QT interval now present
Atrial fibrillation no longer present
Possible ischemia no longer present
 
Electronically Signed On 2018 12:44:39 CDT by Christopher Telles
[email or web address]
 
 
 
 
 
 
 
 
 
 
 
  <ELECTRONICALLY SIGNED>
                                           By: Christopher Telles MD, [facility]     
  18     1244
D: 18 1156   _____________________________________
T: 18 1156   Christopher Telles MD, [facility]       /EPI

## 2018-09-04 NOTE — EKG
[place] [address]
Phone:  [phone]                     ELECTROCARDIOGRAM REPORT      
_______________________________________________________________________________
 
Name:       KRISTIN ROMO                  Room:           [address]    ADM IN  
M.R.#:  [id number]      Account #:      [id number]  
Admission:  18     Attend Phys:    Matt Guadarrama, 
Discharge:               Date of Birth:  [birth year]  
         Report #: [id number]
    [id number]
_______________________________________________________________________________
THIS REPORT FOR:  //name//                      
 
                          [facility]
                                       
Test Date:    2018               Test Time:    11:36:44
Pat Name:     KRISTIN ROMO              Department:   
Patient ID:   [id number]            Room:         [facility]
Gender:       F                        Technician:   MB
:          1932               Requested By: Benitez Gauthier
Order Number: [id number]    Reading MD:   Christopher Telles
                                 Measurements
Intervals                              Axis          
Rate:         90                       P:            104
SD:           204                      QRS:          -23
QRSD:         93                       T:            250
QT:           384                                    
QTc:          470                                    
                           Interpretive Statements
Sinus rhythm
Borderline left axis deviation
Nonspecific repol abnormality, diffuse leads
Baseline wander in lead(s) I,II,aVR
Compared to ECG 2018 08:31:38
Atrial fibrillation no longer present
Possible ischemia no longer present
 
Electronically Signed On 2018 12:53:54 CDT by Christopher Telles
[email or web address]
 
 
 
 
 
 
 
 
 
 
 
 
 
 
 
  <ELECTRONICALLY SIGNED>
                                           By: Christopher Telles MD, [facility]     
  18     1253
D: 18 1136   _____________________________________
T: 18 1136   Christopher Telles MD, [facility]       /EPI

## 2018-09-04 NOTE — NUR
ASSUMED CARE OF PT THIS AM AROUND 0715- CARDIAC MONITOR IN PLACE AS ORDERED,
TRACING SR- UPON ASSESSMENT PT NOTED TO BE RESTING IN BED- PT A&O X4,
FORGETFULL-NOTED ANXIETY AT TIMES- CONTINET OF BOWEL AND BLADDER- LCTA,
EXPIRATORY WHEEZE NOTED TO BASES- OCCASSIONAL NON-PRODUCTIVE COUGH NOTED-
ABDOMEN SOFT/ROUND/NON-TENDER, BS X4 QUADS- PT REPORTED TO HAVE HAD BM EARLY
AM, NO VISIBLE SIGNS OF BLEEDING REPORTED- LEFT SUB-CLAV TRIPLE LUMEN PICC
NNOTED INTACT WITH IVF INFUSSING PRESCIBED, DRESSING C/D/I WITH NO S/S
INFECTION-IV ABT GIVEN THIS AM WITH NO ADVERSE REACTIONS NOTED- PT DENIES
ANY C/O PAIN/DISCOMFORT- POOR PO INTAKE NOTED-CALL LIGHT AND PERSONAL
BELONGINGS WITH IN REACH-HOURLY ROUNDS IN PLACE R/T SAFETY/NEEDS- CHAIR/BED
ALARM IN PLACE R/T SAFETY/NEEDS- ALL NEEDS MET AT THIS TIME-WCTM

## 2018-09-04 NOTE — EKG
[place] [address]
Phone:  [phone]                     ELECTROCARDIOGRAM REPORT      
_______________________________________________________________________________
 
Name:       KRISTIN ROMO                  Room:           [address]    ADM IN  
.R.#:  [id number]      Account #:      [id number]  
Admission:  18     Attend Phys:    Matt Guadarrama, 
Discharge:               Date of Birth:  [birth year]  
         Report #: [id number]
    [id number]
_______________________________________________________________________________
THIS REPORT FOR:  //name//                      
 
                         [facility] ED
                                       
Test Date:    2018               Test Time:    14:20:10
Pat Name:     KRISTIN ROMO              Department:   
Patient ID:   [id number]            Room:         [facility]
Gender:       F                        Technician:   COLEEN
:          1932               Requested By: Estefany Rosa
Order Number: [id number] MD:   Christopher Telles
                                 Measurements
Intervals                              Axis          
Rate:         102                      P:            142
GA:           187                      QRS:          -16
QRSD:         77                       T:            142
QT:           371                                    
QTc:          484                                    
                           Interpretive Statements
Sinus  tachycardia
Atrial premature complex
Borderline left axis deviation
Repol abnrm, severe global ischemia (LM/MVD)
Compared to ECG 2018 08:31:38
Atrial premature complex(es) now present
anterolateral and high lateral st depression more prominent
 
Electronically Signed On 2018 12:47:30 CDT by Christopher Telles
[email or web address]
 
 
 
 
 
 
 
 
 
 
 
 
 
 
 
  <ELECTRONICALLY SIGNED>
                                           By: Christopher Telles MD, FAC     
  18     1247
D: 18 1420   _____________________________________
T: 18 1420   Christopher Telles MD, FAC       /EPI

## 2018-09-04 NOTE — 2DMMODE
[place] [address]
Phone:  [phone] 2 D/M-MODE ECHOCARDIOGRAM     
_______________________________________________________________________________
 
Name:         KRISTIN ROMO                 Room:          [address] [place] 
[facility]#:    [id number]     Account #:     [id number]  
Admission:    18    Attend Phys:   Matt Hammond
Discharge:                Date of Birth: [birth year]  
Date of Service: 18 1320  Report #:      [id number]
        [id number]
_______________________________________________________________________________
THIS REPORT FOR:  //name//                      
 
 
--------------- APPROVED REPORT --------------
 
 
Study performed:  2018 10:50:42
 
EXAM: Comprehensive 2D, Doppler, and color-flow 
Echocardiogram 
Patient Location: In-Patient   
Room #:  [facility]     Status:  routine
 
      BSA:         1.45
HR: 97 bpm BP:          143/66 mmHg 
Rhythm: NSR     
 
Other Information 
Study Quality: Good
 
Indications
Acute MI 
 
2D Dimensions
   LVEF(%):  66.25 (&gt;50%)
IVSd:  11.69 (7-11mm) LVOT Diam:  16.67 (18-24mm) 
LVDd:  40.95 mm  
PWd:  10.40 (7-11mm) Ascending Ao:  26.16 (22-36mm)
LVDs:  26.16 (25-40mm) 
Aortic Root:  25.81 mm 
   Dee's LVEF:  66.25 %
 
Volumes
Left Atrial Volume (Systole) 
    LA ESV Index:  58.00 mL/m2
 
Aortic Valve
AoV Peak Sin.:  1.60 m/s 
AO Peak Gr.:  10.25 mmHg LVOT Max P.99 mmHg
AO Mean Gr.:  5.64 mmHg  LVOT Mean P.06 mmHg
    LVOT Max V:  0.71 m/s
AO V2 VTI:  29.61 cm  LVOT Mean V:  0.48 m/s
ABBI (VTI):  1.05 cm2  LVOT V1 VTI:  14.28 cm
AI [place]:  2.65 m/s2  
AI PHT:  382.97 ms  
 
 
 
[place] [address]
Phone:  [phone]                     2 D/M-MODE ECHOCARDIOGRAM     
_______________________________________________________________________________
 
Name:         KRISTIN ROMO                 Room:          [address] [place] 
.R.#:    [id number]     Account #:     [id number]  
Admission:    18    Attend Phys:   Matt Hammond
Discharge:                Date of Birth: [birth year]  
Date of Service: 18 1320  Report #:      [id number]
        [id number]
_______________________________________________________________________________
Mitral Valve
    E/A Ratio:  1.37
    MV Decel. Time:  126.39 ms
MV E Max Sin.:  1.46 m/s 
MV PHT:  36.65 ms  
MVA (PHT):  6.00 cm2  
 
TDI
E/Lateral E':  20.86 
   Lateral E' Sin.:  0.07 m/s
 
Pulmonary Valve
PV Peak Sin.:  1.03 m/s PV Peak Gr.:  4.27 mmHg
 
Tricuspid Valve
    RAP Estimate:  5.00 mmHg
TR Peak Gr.:  39.77 mmHg RVSP:  44.77 mmHg
    PA Pressure:  44.77 mmHg
 
Left Ventricle
The left ventricle is normal size. There is normal LV segmental wall 
motion. There is normal left ventricular wall thickness. Left 
ventricular systolic function is normal. LVEF is 50-55%. Transmitral 
Doppler flow pattern suggests impaired LV relaxation.
 
Right Ventricle
The right ventricle is normal size. The right ventricular systolic 
function is normal.
 
Atria
Left atrium is moderately dilated. Right atrium is moderately 
dilated.
 
Aortic Valve
Moderate aortic valve sclerosis. Mild aortic regurgitation. Mild 
aortic stenosis.
 
Mitral Valve
The mitral valve is normal in structure. Mild mitral regurgitation. 
No evidence of mitral valve stenosis.
 
Tricuspid Valve
The tricuspid valve is normal in structure. Moderate tricuspid 
regurgitation. Moderate pulmonary hypertension.
 
Pulmonic Valve
 
 
[place] [address]
Phone:  [phone]                     2 D/M-MODE ECHOCARDIOGRAM     
_______________________________________________________________________________
 
Name:         KRISTIN ROMO                 Room:          [address] [place] 
[facility]#:    [id number]     Account #:     [id number]  
Admission:    18    Attend Phys:   Matt Hammond
Discharge:                Date of Birth: [birth year]  
Date of Service: 18 1320  Report #:      [id number]
        [id number]
_______________________________________________________________________________
The pulmonary valve is normal in structure. Trace pulmonic 
regurgitation.
 
Great Vessels
The aortic root is normal in size. IVC is normal in size and 
collapses with &gt;50% inspiration
 
Pericardium
There is no pericardial effusion.
 
&lt;Conclusion&gt;
The left ventricle is normal size.
There is normal left ventricular wall thickness.
Left ventricular systolic function is normal.
LVEF is 50-55%.
Transmitral Doppler flow pattern suggests impaired LV 
relaxation.
Left atrium is moderately dilated.
Right atrium is moderately dilated.
Moderate aortic valve sclerosis.
Mild aortic regurgitation.
Mild aortic stenosis.
Mild mitral regurgitation.
Moderate tricuspid regurgitation.
Moderate pulmonary hypertension.
Trace pulmonic regurgitation.
 
 
 
 
 
 
 
 
 
 
 
 
 
 
 
 
 
 
  <ELECTRONICALLY SIGNED>
                                           By: Gerald Bryant MD, FACC   
  18     1320
D: 18   _____________________________________
T: 18   Gerald Bryant MD, FACC     /INF

## 2018-09-04 NOTE — NUR
PT SLEPT WELL AFTER RECIEVING PO NORCO AND KLONAPIN. PT BEING TRANSFERED TO
ROOM 213. REPORT GIVEN TO KHOA SILVA RN.

## 2018-09-05 VITALS — SYSTOLIC BLOOD PRESSURE: 144 MMHG | DIASTOLIC BLOOD PRESSURE: 64 MMHG

## 2018-09-05 VITALS — DIASTOLIC BLOOD PRESSURE: 71 MMHG | SYSTOLIC BLOOD PRESSURE: 146 MMHG

## 2018-09-05 VITALS — SYSTOLIC BLOOD PRESSURE: 160 MMHG | DIASTOLIC BLOOD PRESSURE: 87 MMHG

## 2018-09-05 VITALS — SYSTOLIC BLOOD PRESSURE: 148 MMHG | DIASTOLIC BLOOD PRESSURE: 77 MMHG

## 2018-09-05 VITALS — DIASTOLIC BLOOD PRESSURE: 65 MMHG | SYSTOLIC BLOOD PRESSURE: 147 MMHG

## 2018-09-05 LAB
ABSOLUTE BASOPHILS: 0.1 THOU/UL (ref 0–0.2)
ABSOLUTE MONOCYTES: 0.7 THOU/UL (ref 0–1.2)
ALBUMIN SERPL-MCNC: 2.6 G/DL (ref 3.4–5)
ALP SERPL-CCNC: 102 U/L (ref 46–116)
ALT SERPL-CCNC: 33 U/L (ref 30–65)
ANION GAP SERPL CALC-SCNC: 12 MMOL/L (ref 7–16)
ANISOCYTOSIS BLD QL SMEAR: (no result)
AST SERPL-CCNC: 46 U/L (ref 15–37)
BASOPHILS NFR BLD AUTO: 1 %
BILIRUB SERPL-MCNC: 0.6 MG/DL
BUN SERPL-MCNC: 22 MG/DL (ref 7–18)
CALCIUM SERPL-MCNC: 8.1 MG/DL (ref 8.5–10.1)
CHLORIDE SERPL-SCNC: 106 MMOL/L (ref 98–107)
CO2 SERPL-SCNC: 17 MMOL/L (ref 21–32)
CREAT SERPL-MCNC: 1.3 MG/DL (ref 0.6–1.3)
GLUCOSE SERPL-MCNC: 122 MG/DL (ref 70–99)
GRANULOCYTES NFR BLD MANUAL: 88 %
HCT VFR BLD CALC: 23.7 % (ref 37–47)
HGB BLD-MCNC: 7.9 GM/DL (ref 12–15)
LYMPHOCYTES # BLD: 0.4 THOU/UL (ref 0.8–5.3)
LYMPHOCYTES NFR BLD AUTO: 4 %
MCH RBC QN AUTO: 28.9 PG (ref 26–34)
MCHC RBC AUTO-ENTMCNC: 33.2 G/DL (ref 28–37)
MCV RBC: 87 FL (ref 80–100)
MONOCYTES NFR BLD: 7 %
MPV: 8.6 FL. (ref 7.2–11.1)
NEUTROPHILS # BLD: 8.5 THOU/UL (ref 1.6–8.1)
NUCLEATED RBCS: 0 /100WBC
PLATELET # BLD EST: ADEQUATE 10*3/UL
PLATELET COUNT*: 163 THOU/UL (ref 150–400)
POIKILOCYTOSIS BLD QL SMEAR: (no result)
POTASSIUM SERPL-SCNC: 4 MMOL/L (ref 3.5–5.1)
PROT SERPL-MCNC: 5.8 G/DL (ref 6.4–8.2)
RBC # BLD AUTO: 2.72 MIL/UL (ref 4.2–5)
RDW-CV: 18.5 % (ref 10.5–14.5)
SODIUM SERPL-SCNC: 135 MMOL/L (ref 136–145)
WBC # BLD AUTO: 9.7 THOU/UL (ref 4–11)

## 2018-09-05 NOTE — NUR
PATIENT PROGRESSING TOWARDS GOALS. IV SALINE LOCKED. SHE IS TOLERATING HER
DIET WELL WITHOUT NAUSEA OR VOMIITNG. UP WITH ASSIST X1 TO BEDSIDE COMMODE.
GAIT UNSTEADY. PAIN CONTROLLED WITH PRN PAIN MEDICATION. REPOSITIONED FOR
COMFORT. SHE WAS UP IN THE CHAIR FOR MEALS. BED ALARM ON. CALL LIGHT WITHIN
REACH. HOURLY ROUNDING CHARTED. PLANNING FOR DC TO SNF TOMORROW IF STABLE.
WILL CONTINUE TO MONITOR.

## 2018-09-05 NOTE — NUR
Pt known to this CM from previous hospital stay. Pt A&O, maybe a little
confused when CM asked orientation questions. Pt resides at [facility].
Normally independent, no DME. Hx of [facility]. No hx of SNF. Strong
support sx. Pt would like to go to [facility] [place], wants to go to [facility]. CM to fax referral to [facility]. Following.

## 2018-09-05 NOTE — NUR
PT AAOX3 RESP REG AND UNALBORED SKIN WITH NO ACUTE DISTRESS NOTED. PT DOES
HOWEVER BECOME SOB WTH EXERTION. O2 2L BNC INTACT. PTS LEFT SUBCLAVIAN IV IS
LEAKING AND NOT ABLE TO USE AT THIS TIME. DRESSING CHANGED. VSS AND NO ACUTE
CHANGES DURING THIS SHIFT. WILL CONTINUE TO MONITOR. TELEMETRY PACK INTACT
WITH ALARMS SET.

## 2018-09-06 VITALS — SYSTOLIC BLOOD PRESSURE: 124 MMHG | DIASTOLIC BLOOD PRESSURE: 73 MMHG

## 2018-09-06 VITALS — DIASTOLIC BLOOD PRESSURE: 60 MMHG | SYSTOLIC BLOOD PRESSURE: 136 MMHG

## 2018-09-06 VITALS — DIASTOLIC BLOOD PRESSURE: 73 MMHG | SYSTOLIC BLOOD PRESSURE: 141 MMHG

## 2018-09-06 VITALS — SYSTOLIC BLOOD PRESSURE: 156 MMHG | DIASTOLIC BLOOD PRESSURE: 80 MMHG

## 2018-09-06 VITALS — DIASTOLIC BLOOD PRESSURE: 73 MMHG | SYSTOLIC BLOOD PRESSURE: 132 MMHG

## 2018-09-06 LAB
ABSOLUTE BASOPHILS: 0 THOU/UL (ref 0–0.2)
ABSOLUTE EOSINOPHILS: 0.2 THOU/UL (ref 0–0.7)
ABSOLUTE MONOCYTES: 0.8 THOU/UL (ref 0–1.2)
ANION GAP SERPL CALC-SCNC: 8 MMOL/L (ref 7–16)
BASOPHILS NFR BLD AUTO: 0.4 %
BUN SERPL-MCNC: 23 MG/DL (ref 7–18)
CALCIUM SERPL-MCNC: 7.9 MG/DL (ref 8.5–10.1)
CHLORIDE SERPL-SCNC: 109 MMOL/L (ref 98–107)
CO2 SERPL-SCNC: 18 MMOL/L (ref 21–32)
CREAT SERPL-MCNC: 1.4 MG/DL (ref 0.6–1.3)
EOSINOPHIL NFR BLD: 2.5 %
GLUCOSE SERPL-MCNC: 102 MG/DL (ref 70–99)
GRANULOCYTES NFR BLD MANUAL: 79.4 %
HCT VFR BLD CALC: 20.1 % (ref 37–47)
HCT VFR BLD CALC: 24.7 % (ref 37–47)
HGB BLD-MCNC: 6.7 GM/DL (ref 12–15)
HGB BLD-MCNC: 8.5 GM/DL (ref 12–15)
LYMPHOCYTES # BLD: 0.6 THOU/UL (ref 0.8–5.3)
LYMPHOCYTES NFR BLD AUTO: 7.4 %
MCH RBC QN AUTO: 28.9 PG (ref 26–34)
MCH RBC QN AUTO: 29.5 PG (ref 26–34)
MCHC RBC AUTO-ENTMCNC: 33.1 G/DL (ref 28–37)
MCHC RBC AUTO-ENTMCNC: 34.4 G/DL (ref 28–37)
MCV RBC: 85.9 FL (ref 80–100)
MCV RBC: 87.3 FL (ref 80–100)
MONOCYTES NFR BLD: 10.3 %
MPV: 8.2 FL. (ref 7.2–11.1)
MPV: 8.3 FL. (ref 7.2–11.1)
NEUTROPHILS # BLD: 6.2 THOU/UL (ref 1.6–8.1)
NT-PRO BRAIN NAT PEPTIDE: (no result) PG/ML (ref ?–300)
NUCLEATED RBCS: 0 /100WBC
PLATELET COUNT*: 160 THOU/UL (ref 150–400)
PLATELET COUNT*: 184 THOU/UL (ref 150–400)
POTASSIUM SERPL-SCNC: 4 MMOL/L (ref 3.5–5.1)
RBC # BLD AUTO: 2.3 MIL/UL (ref 4.2–5)
RBC # BLD AUTO: 2.87 MIL/UL (ref 4.2–5)
RDW-CV: 17.5 % (ref 10.5–14.5)
RDW-CV: 18.7 % (ref 10.5–14.5)
SODIUM SERPL-SCNC: 135 MMOL/L (ref 136–145)
WBC # BLD AUTO: 7.8 THOU/UL (ref 4–11)
WBC # BLD AUTO: 9.9 THOU/UL (ref 4–11)

## 2018-09-06 NOTE — NUR
Nutrition: Pt assessed for supplement order. Heart Healthy diet, supplements
requested bid. Wt loss of ~7-10# in 3 months. Usual wt was ~130#, current wt
is 120#. Admitted with N/V, abd pain. H/o gallstones, diverticulitis, CAD,
CKD, HTN, UTI. Pt is tolerating diet well, no N/V today, feeling better. RD
ordered Ensure Enlive b.i.d. for added kcals/protein. Consider Mild risk at
this time. GOALS: >75% intake of meals/supplements, no wt loss from 119#.
Follow up 9/13/18.

## 2018-09-06 NOTE — NUR
RECEIVED PT CARE 0700. SHE IS ALERT AND ORIENTED X4, FORGETFUL AT TIMES. VSS.
CARDIAC MONITOR TRACING SR. O2 SAT 99% ON 2L NC. TITRATED TO ROOM AIR. UP WITH
ASSIST X1 TO CHAIR/BEDSIDE COMMODE. AM ASSESSMENT CHARTED. MEDS PER MAR. SHE
C/O BACK PAIN AND RLQ PAIN. PRN PAIN MEDICATION GIVEN WITH GOOD RELIEF. HGB
6.7. 1 UNIT PACKED RBC'S TRANSFUSED. VITAL SIGNS STABLE. BED/CHAIR ALARM ON.
WILL CONTINUE TO MONITOR.

## 2018-09-06 NOTE — NUR
PT AAOX3 BUT IS FORGETFUL. PT WAS UPSET AT BEGINNING OF SHIFT STATIGN THAT HER
BROTHER HAD BEEN ADMITTED TO THE HOSPITAL IN ANOTHER CITY. TELEMETRY PACK
INTACT WITH ALARMS SET. O2 2L BNC INTACT. PT IS GETING UP EASIER TODAY WITH NO
DISTRESS NOTED. VSS AND NO ACUTE CHANGES DURIGN SHIFT. PT STATED SHE HOPES TO
GO TO REHAB TODAY. WILL CONTINUE TO MONITOR

## 2018-09-07 ENCOUNTER — HOSPITAL ENCOUNTER (INPATIENT)
Dept: HOSPITAL 96 - M.REH | Age: 83
LOS: 11 days | Discharge: ANOTHER HOSPITAL | DRG: 70 | End: 2018-09-18
Attending: PHYSICAL MEDICINE & REHABILITATION | Admitting: PHYSICAL MEDICINE & REHABILITATION
Payer: MEDICARE

## 2018-09-07 VITALS — HEIGHT: 59 IN | WEIGHT: 128.31 LBS | BODY MASS INDEX: 25.87 KG/M2

## 2018-09-07 VITALS — SYSTOLIC BLOOD PRESSURE: 150 MMHG | DIASTOLIC BLOOD PRESSURE: 69 MMHG

## 2018-09-07 VITALS — SYSTOLIC BLOOD PRESSURE: 155 MMHG | DIASTOLIC BLOOD PRESSURE: 72 MMHG

## 2018-09-07 VITALS — DIASTOLIC BLOOD PRESSURE: 77 MMHG | SYSTOLIC BLOOD PRESSURE: 147 MMHG

## 2018-09-07 VITALS — DIASTOLIC BLOOD PRESSURE: 84 MMHG | SYSTOLIC BLOOD PRESSURE: 153 MMHG

## 2018-09-07 VITALS — DIASTOLIC BLOOD PRESSURE: 78 MMHG | SYSTOLIC BLOOD PRESSURE: 145 MMHG

## 2018-09-07 DIAGNOSIS — N39.0: ICD-10-CM

## 2018-09-07 DIAGNOSIS — Z85.3: ICD-10-CM

## 2018-09-07 DIAGNOSIS — R10.9: ICD-10-CM

## 2018-09-07 DIAGNOSIS — Z88.0: ICD-10-CM

## 2018-09-07 DIAGNOSIS — I12.9: ICD-10-CM

## 2018-09-07 DIAGNOSIS — I25.10: ICD-10-CM
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
## 2018-09-07 DIAGNOSIS — D52.9: ICD-10-CM

## 2018-09-07 DIAGNOSIS — E11.51: ICD-10-CM

## 2018-09-07 DIAGNOSIS — Z79.899: ICD-10-CM

## 2018-09-07 DIAGNOSIS — Z87.440: ICD-10-CM

## 2018-09-07 DIAGNOSIS — Z91.040: ICD-10-CM

## 2018-09-07 DIAGNOSIS — N18.9: ICD-10-CM

## 2018-09-07 DIAGNOSIS — K44.9: ICD-10-CM

## 2018-09-07 DIAGNOSIS — Z95.1: ICD-10-CM

## 2018-09-07 DIAGNOSIS — Z90.13: ICD-10-CM
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
## 2018-09-07 DIAGNOSIS — H91.93: ICD-10-CM

## 2018-09-07 DIAGNOSIS — Z79.82: ICD-10-CM

## 2018-09-07 DIAGNOSIS — K57.33: ICD-10-CM
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
## 2018-09-07 DIAGNOSIS — E11.22: ICD-10-CM

## 2018-09-07 DIAGNOSIS — K27.4: ICD-10-CM

## 2018-09-07 DIAGNOSIS — B02.8: ICD-10-CM

## 2018-09-07 DIAGNOSIS — G93.40: Primary | ICD-10-CM

## 2018-09-07 DIAGNOSIS — Z82.49: ICD-10-CM

## 2018-09-07 DIAGNOSIS — Z83.3: ICD-10-CM

## 2018-09-07 DIAGNOSIS — K21.9: ICD-10-CM
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
## 2018-09-07 DIAGNOSIS — S42.309A: ICD-10-CM

## 2018-09-07 DIAGNOSIS — Z90.49: ICD-10-CM

## 2018-09-07 DIAGNOSIS — Z88.2: ICD-10-CM

## 2018-09-07 DIAGNOSIS — D50.0: ICD-10-CM

## 2018-09-07 LAB
ALBUMIN SERPL-MCNC: 2.6 G/DL (ref 3.4–5)
ALP SERPL-CCNC: 270 U/L (ref 46–116)
ALT SERPL-CCNC: 23 U/L (ref 30–65)
ANION GAP SERPL CALC-SCNC: 14 MMOL/L (ref 7–16)
AST SERPL-CCNC: 31 U/L (ref 15–37)
BILIRUB SERPL-MCNC: 0.5 MG/DL
BUN SERPL-MCNC: 22 MG/DL (ref 7–18)
CALCIUM SERPL-MCNC: 8.3 MG/DL (ref 8.5–10.1)
CHLORIDE SERPL-SCNC: 103 MMOL/L (ref 98–107)
CO2 SERPL-SCNC: 19 MMOL/L (ref 21–32)
CREAT SERPL-MCNC: 1.5 MG/DL (ref 0.6–1.3)
GLUCOSE SERPL-MCNC: 94 MG/DL (ref 70–99)
HCT VFR BLD CALC: 26.7 % (ref 37–47)
HGB BLD-MCNC: 9.3 GM/DL (ref 12–15)
MCH RBC QN AUTO: 29.4 PG (ref 26–34)
MCHC RBC AUTO-ENTMCNC: 34.7 G/DL (ref 28–37)
MCV RBC: 84.7 FL (ref 80–100)
MPV: 8.3 FL. (ref 7.2–11.1)
PLATELET COUNT*: 225 THOU/UL (ref 150–400)
POTASSIUM SERPL-SCNC: 3.8 MMOL/L (ref 3.5–5.1)
PROT SERPL-MCNC: 5.7 G/DL (ref 6.4–8.2)
RBC # BLD AUTO: 3.15 MIL/UL (ref 4.2–5)
RDW-CV: 17.2 % (ref 10.5–14.5)
SODIUM SERPL-SCNC: 136 MMOL/L (ref 136–145)
WBC # BLD AUTO: 8.7 THOU/UL (ref 4–11)

## 2018-09-07 NOTE — NUR
ASSUMED CARE OF PT AT 1900. PT IS ALERT AND ORIENTED. VSS. PERRLA. PT REPORTS
SOME BACK PAIN. PT BECAME SLIGHTLY CONFUSED AND A LITTLE ANGRY RIGHT BEFORE
SHE FELL ASLEEP. PT IS IN SINUS RYTHM ON THE TELEMETRY. PT IS RESTING
COMFORTABLY IN BED. RESPIRATIONS ARE EVEN AND NONLABORED. WILL CONTINUE TO
MONITOR PT.

## 2018-09-07 NOTE — NUR
ASSUMED CARE THIS AM, A/O, VSS, NO DISTRESS NOTED, SEE ASSESSMENT FOR DETAILS.
 ORDERS RECEIVED FOR DISCHARGE TO ACUTE REHAB, REPORT GIVEN TO YANNI, PERSONAL
EFFECTS GATHERED, ACCOUNTED FOR, IN COMPANY OF PATIENT, TRANSPORTED VIA
WHEELCHAIR TO [facility] IN STABLE CONDITION.

## 2018-09-08 VITALS — DIASTOLIC BLOOD PRESSURE: 86 MMHG | SYSTOLIC BLOOD PRESSURE: 143 MMHG

## 2018-09-08 LAB
ANION GAP SERPL CALC-SCNC: 8 MMOL/L (ref 7–16)
BUN SERPL-MCNC: 17 MG/DL (ref 7–18)
CALCIUM SERPL-MCNC: 8 MG/DL (ref 8.5–10.1)
CHLORIDE SERPL-SCNC: 103 MMOL/L (ref 98–107)
CO2 SERPL-SCNC: 24 MMOL/L (ref 21–32)
CREAT SERPL-MCNC: 1.4 MG/DL (ref 0.6–1.3)
GLUCOSE SERPL-MCNC: 102 MG/DL (ref 70–99)
HCT VFR BLD CALC: 27 % (ref 37–47)
HGB BLD-MCNC: 9.2 GM/DL (ref 12–15)
MCH RBC QN AUTO: 29 PG (ref 26–34)
MCHC RBC AUTO-ENTMCNC: 34.2 G/DL (ref 28–37)
MCV RBC: 84.7 FL (ref 80–100)
MPV: 7.7 FL. (ref 7.2–11.1)
PLATELET COUNT*: 247 THOU/UL (ref 150–400)
POTASSIUM SERPL-SCNC: 3.4 MMOL/L (ref 3.5–5.1)
RBC # BLD AUTO: 3.18 MIL/UL (ref 4.2–5)
RDW-CV: 17.9 % (ref 10.5–14.5)
SODIUM SERPL-SCNC: 135 MMOL/L (ref 136–145)
WBC # BLD AUTO: 7.8 THOU/UL (ref 4–11)

## 2018-09-08 NOTE — CON
[address]  [address]                    CONSULTATION                  
_______________________________________________________________________________
 
Name:       KRISTIN ROMO                  Room:           [address] [place]  
M.R.#:  [id number]      Account #:      [id number]  
Admission:  09/02/18     Attend Phys:    Matt Guadarrama, 
Discharge:  09/07/18     Date of Birth:  [birth year]  
         Report #: [id number]
                                                                     [id number]  
_______________________________________________________________________________
THIS REPORT FOR:  //name//                      
 
CC: Nevin Alvarez MD [facility]
    Matt Guadarrama MD
 
DATE OF SERVICE:  09/02/2018
 
 
REFERRING PHYSICIAN:  Matt Guadarrama MD
 
REASON FOR CONSULTATION:  Hematemesis.
 
IMPRESSION:
1.  Acute anemia with associated melena and hematemesis - suspect upper
gastrointestinal bleed.
2.  Right upper quadrant pain with abnormal CAT scan of the abdomen and pelvis
revealing some thickening of the colon to the level of hepatic flexure with
pericolonic stranding - evaluate for diverticulitis versus colitis versus less
likely tumor.
3.  History of diverticulitis in the past involving the descending and sigmoid
colon.
4.  Coronary artery disease with previous bypass surgery for the same.
5.  Personal history of breast cancer with previous mastectomies.
6.  Mild dementia.
 
RECOMMENDATIONS:
1.  Agree with the patient being admitted to ICU.
2.  N.p.o. except for ice chips for now.
3.  We will begin the patient on Protonix 40 mg IV q. 12 hours.
4.  Agree with IV antibiotics for possible diverticulitis.
5.  We will proceed with upper endoscopy tomorrow either tomorrow late morning
or early afternoon or earlier if she should develop any overt hematemesis.
6.  We would hold all anticoagulants nonsteroidals, and aspirin for now.
 
I have discussed the plans with the patient as well and she is agreeable to the
same.
 
HISTORY OF PRESENT ILLNESS:  The patient is a pleasant 86-year-old white female
who had been having some problem with rather severe abdominal pain associated
with nausea, vomiting, and black tarry stools.  She is recently recovered from
recent laparoscopic cholecystectomy for gallbladder disease, had been doing well
until she came to the emergency room.  She has a known history of diverticular
disease in the past and has been treated for the same.  She denies any
 
 
 
[place] [address]                    CONSULTATION                  
_______________________________________________________________________________
 
Name:       KRISTIN ROMO                  Room:           M.213-P    [facility] [place]  
M.R.#:  [id number]      Account #:      [id number]  
Admission:  09/02/18     Attend Phys:    Matt Guadarrama, 
Discharge:  09/07/18     Date of Birth:  [birth year]  
         Report #: [id number]
                                                                     [id number]  
_______________________________________________________________________________
complaints of any dysphagia, odynophagia, postprandial pain or any other major
issues.  She is very emotional at this time and her history is not all that well
received.  Most of the history is obtained from the patient's emergency room
visit.  She also received some promethazine in the emergency room because of
problems with nausea.  She is admitted to the hospital for further treatment.
 
ALLERGIES:  LATEX, PENICILLIN, SULFA.
 
MEDICATIONS:  Lexapro, vitamin D3, Pravachol, aspirin, pantoprazole 40 mg once
daily and Bystolic.  She may or may not have been taking naproxen, but she does
not recall.
 
PAST MEDICAL AND SURGICAL HISTORY:  Remarkable for previous coronary artery
disease with previous open heart surgery for the same.  She has had bilateral
breast cancer, for which she has had surgeries for the same.  Appendectomy.  She
has underlying hyperlipidemia, diabetes, chronic reflux, history of hiatal
hernia.  She has had a broken arm in the past.
 
SOCIAL HISTORY:  The patient does not smoke or drink.
 
FAMILY HISTORY:  Negative.
 
PHYSICAL EXAMINATION:
GENERAL:  Pleasant, but very anxious 86-year-old white female who is awake and
alert.
CARDIOPULMONARY:  Revealed a regular rate and rhythm.
LUNGS:  Clear.
ABDOMEN:  Soft.  She is mildly tender in lower quadrants.  No rebound or
guarding noted.
 
LABORATORY TESTS:  From admission revealed a white count of 10.8, hemoglobin
9.0; platelet count 263,000, MCV is 83, RDW 18.5.  Her sodium is 134, potassium
3.8, chloride 101, bicarbonate is 22.  Her BUN is 62, creatinine 1.4.  Her total
bilirubin is 0.4, alkaline phosphatase 66, AST is 14, ALT is 11.  Her albumin is
3.3.  Her INR is 1.0.  Her troponin is less than 0.6.
 
Within 6 hours, the patient's hemoglobin dropped down to 7.5 with some
hemodynamic instability.  She is admitted to the ICU for further care.
 
The patient did undergo a CT scan of the abdomen and pelvis with IV contrast,
which revealed short segment of mural thickening and pericolonic soft tissue
stranding at the level of hepatic flexure, which could represent diverticular
disease.  In comparison to previous CT scan done in July, this was not present
and likely represents an area of diverticulosis, as the patient has had previous
bouts of the same.
 
 
 
 
[address]  [address]                    CONSULTATION                  
_______________________________________________________________________________
 
Name:       KRISTIN ROMO                  Room:           213-P    [facility] [place]  
M.R.#:  [id number]      Account #:      [id number]  
Admission:  09/02/18     Attend Phys:    Matt BRISSAEleonora Zacariaseh, 
Discharge:  09/07/18     Date of Birth:  [birth year]  
         Report #: [id number]
                                                                     [id number]  
_______________________________________________________________________________
DISCUSSION:  At the present time, the patient has some issues with GI bleed.  We
will proceed with upper endoscopy today or tomorrow depending on how she is
doing and make further recommendations thereafter.  Discussed the plans with the
patient as well and she is agreeable to the same.  With regards to the
pericolonic stranding at level of hepatic flexure, we will determine whether or
not we will treat her empirically for diverticulitis and whether or not she is a
candidate for endoscopic evaluation of her lower GI tract even during his
hospital stay or more likely as an outpatient testing and otherwise.
 
 
 
 
 
 
 
 
 
 
 
 
 
 
 
 
 
 
 
 
 
 
 
 
 
 
 
 
 
 
 
 
 
 
 
 
<ELECTRONICALLY SIGNED>
                                        By:  Abhishek Salvador DO          
09/08/18     0755
D: 09/06/18 0500_______________________________________
T: 09/06/18 0744Abhishek Salvador DO             /nt
[address]  [address]                    CONSULTATION                  
_______________________________________________________________________________
 
Name:       KRISTIN ROMO                  Room:           [address] [place]  
M.R.#:  [id number]      Account #:      [id number]  
Admission:  09/02/18     Attend Phys:    Matt Guadarrama, 
Discharge:               Date of Birth:  [birth year]  
         Report #: [id number]
                                                                     [id number]  
_______________________________________________________________________________
THIS REPORT FOR:  //name//                      
 
CC: Nevin Guadarrama
 
DATE OF SERVICE:  09/03/2018
 
 
ATTENDING PHYSICIAN:  Matt Guadarrama MD
 
REASON FOR EVALUATION:  Acute diverticulitis.
 
HISTORY OF PRESENT ILLNESS:  Chart reviewed, patient examined.  This is an
86-year-old whom I am familiar with having seen her last month.  At that point,
she presented with abdominal pain, was felt to have acute cholecystitis.  She
did require some resuscitation, underwent laparoscopic cholecystectomy.  She
does have probably urinary tract infection as well.  She was treated; however,
she was readmitted through the Emergency Room yesterday with complaints of
vomiting, associated nausea with diarrhea, right lower quadrant pain, did have
some fevers as well.  Evaluation including imaging suggested evidence of acute
diverticulitis involving the hepatic flexure.  There is no evidence of
perforation or abscess.  She was empirically started on therapy with
metronidazole and Cipro.  She is scheduled to undergo EGD.  She is quite
anxious, does admit to significant pain associated with the right side.
 
ALLERGIES:  PENICILLINS, SULFA, LATEX.
 
MEDICATIONS:  Include pantoprazole, metronidazole, ciprofloxacin, p.r.n.
analgesics, antiemetics.
 
PAST MEDICAL HISTORY:  The patient has known vasculopathy, coronary artery
disease, hypertension.
 
PREVIOUS SURGERIES:  Coronary artery bypass grafting, cholecystectomy,
appendectomy, bilateral mastectomies.
 
SOCIAL HISTORY:  Nonsmoker, no ethanol.
 
FAMILY HISTORY:  Noncontributory.
 
REVIEW OF SYSTEMS:  Somewhat limited.
 
PHYSICAL EXAMINATION:
GENERAL:  Again, quite anxious, very scared.
VITAL SIGNS:  Temperature 98.7, pulse 85, respirations 36, blood pressure
128/56.
 
 
 
[place] [address]                    CONSULTATION                  
_______________________________________________________________________________
 
Name:       KRISTIN ROMO                  Room:           [address]#:  [id number]      Account #:      [id number]  
Admission:  09/02/18     Attend Phys:    Matt Guadarrama, 
Discharge:               Date of Birth:  [birth year]  
         Report #: [id number]
                                                                     [id number]  
_______________________________________________________________________________
SKIN:  Warm, dry, no rashes.
HEENT:  Otherwise, unremarkable.  Nasal cannula oxygen in place.
NECK:  Supple.
LUNGS:  Diminished breath sounds, scattered crackles at the bases.
HEART:  Regular.  I do not appreciate murmur.
ABDOMEN:  Soft.  There are no overt peritoneal signs.  It is tender,
particularly on the right side.
GENITOURINARY:  Deferred.
RECTAL:  Deferred.
 
LABORATORY DATA:  Most recent labs, sodium 139, potassium 4.1, chloride 111,
bicarbonate is 23, BUN and creatinine 33 and 1.2, glucose of 103.  Estimated GFR
of 43.  CBC:  White count 9.8, H and H 8.0 and 23.7, platelets 168.  Blood
cultures are sterile thus far.  Chest x-ray initially showed no acute process. 
LFTs unremarkable.  Albumin of 3.3.  Total protein of 6.7.  ABGs:  PH 7.451,
pCO2 of 30.3, pO2 of 71.7 on room air.  Lactic acid initially was 1.1. 
Urinalysis 0-5 white cells.  CT as noted above.
 
ASSESSMENT:  Acute diverticulitis in the setting of recent hospitalization.  We
will continue broad spectrum therapy.  Await results pending studies.  We will
have to monitor expectantly.  Certainly at risk for nosocomial related
infectious complications.
 
 
 
 
 
 
 
 
 
 
 
 
 
 
 
 
 
 
 
 
 
 
<ELECTRONICALLY SIGNED>
                                        By:  Wan Escoto MD           
09/04/18     0802
D: 09/03/18 1027_______________________________________
T: 09/03/18 1327Joseleticia Escoto MD              /nt
[address]  [address]                    CONSULTATION                  
_______________________________________________________________________________
 
Name:       KRISTIN ROMO                  Room:           [address] [place]  
.R.#:  [id number]      Account #:      [id number]  
Admission:  09/02/18     Attend Phys:    Matt Guadarrama, 
Discharge:               Date of Birth:  [birth year]  
         Report #: [id number]
                                                                     [id number]  
_______________________________________________________________________________
THIS REPORT FOR:  //name//                      
 
CC: Nevin Guadarrama
 
DATE OF SERVICE:  09/03/2018
 
 
CHIEF COMPLAINT:  GI bleeding, abnormal troponin and ECG.
 
HISTORY OF PRESENT ILLNESS:  The patient is an 86-year-old female who had a
laparoscopic cholecystectomy for cholelithiasis and _____ on 07/18/2018.  She
had done well relatively until a few days ago when she started to have lower
quadrant pain and presented to the Emergency Department with acute
diverticulitis, nausea and vomiting and fever.  At that time, her hemoglobin was
9.0.  She was admitted for acute diverticulitis and her hemoglobin dropped to
7.5.  She denied having chest pain or pressure, but somewhere along the line
serial cardiac troponin levels were checked and her troponin I was 5.56 this
morning.  A second set is pending.  Upon my arrival, the patient's ECG shows a
sinus rhythm with diffuse T-wave inversions, but no ST segment elevation.  She
remains chest pain free.  She is alert, oriented.  She is now in the ICU with
stable hemodynamics.
 
She had an EGD, which revealed a hiatal hernia with multiple Neel ulcers and
she has been on Protonix IV.  The plan is for her to have a colonoscopy later in
the hospitalization because of diverticula noted on the CT scan of the abdomen.
 
From a cardiovascular standpoint, she has a history of prior bypass surgery
remotely when she is followed by Dr. Bernal fairly regularly and has had
negative stress test in the past.  She has hypertension, hyperlipidemia,
frequent urinary tract infections and known history of diverticulitis and known
history of chronic anemia.
 
ALLERGIES:  SHE HAS ALLERGIES TO PENICILLIN AND SULFA.
 
HOME MEDICATIONS:  Include pravastatin 20 mg daily, baby aspirin, Protonix,
Bystolic 5 mg daily and escitalopram 10 mg daily.
 
PAST SURGICAL HISTORY:  Prior in addition to bypass surgery she has also had
breast cancer surgery and the aforementioned cholecystectomy.
 
REVIEW OF SYSTEMS:
GASTROINTESTINAL:  No nausea or vomiting at this time.  No abdominal pain.
GENITOURINARY:  No dysuria or hematuria.
CARDIOVASCULAR:  No chest pain or pressure.  No neck or jaw discomfort.  No
palpitations.
 
 
 
[place] [address]                    CONSULTATION                  
_______________________________________________________________________________
 
Name:       KRISTIN ROMO                  Room:           [address]#:  [id number]      Account #:      [id number]  
Admission:  09/02/18     Attend Phys:    Matt Guadarrama, 
Discharge:               Date of Birth:  [birth year]  
         Report #: [id number]
                                                                     [id number]  
_______________________________________________________________________________
PULMONARY:  No shortness of breath.
HEMATOLOGIC:  Positive anemia.  No bleeding disorders.
RENAL:  No history of kidney failure.
GENERAL:  No fevers or chills.
SKIN:  No rashes.
NEUROLOGIC:  Denies any headaches, blurry vision, slurred speech, numbness or
weakness.
 
PHYSICAL EXAMINATION: 
VITAL SIGNS:  Blood pressure is 128/56, pulse is 85, respiratory rate 18, O2
sats 92% on 3 liters nasal cannula.
GENERAL:  This is an elderly female.  She is alert, oriented, no apparent
distress.
HEENT:  Eyes:  EOMs intact.  No facial asymmetry.  Sclerae are anicteric.  There
is some pallor in her sclerae.
NECK:  Supple.  No jugular venous distention.
CARDIOVASCULAR:  Regular, I cannot hear a murmur.  There is no rub or gallop.
LUNGS:  Clear to auscultation.
ABDOMEN:  Soft, diffusely tender.
EXTREMITIES:  There is no peripheral edema.  There is some peripheral wasting.
 
LABORATORY DATA:  Hemoglobin is 8.0, white blood count is 9.8, platelet count
168,000.  INR is 1.0.  Chest x-ray, 09/02/2018, demonstrates mild cardiomegaly
with slight vascular prominence.
 
IMPRESSION:
1.  Abnormal troponin.  She has an abnormally elevated troponin level secondary
to demand ischemia most likely.  Her blood pressures are currently stable in the
120s/50s and she remains in a sinus rhythm.  Her ECG is abnormal.  However, she
has no clinical symptoms for angina and this does not seem to be an acute
thrombotic event, so I would continue with the conservative approach.
2.  Anemia.  I would like to keep her hemoglobin level greater than 9.
3.  Coronary artery disease status post prior coronary artery bypass graft.  We
will check an echocardiogram to assess left ventricular function and compared to
prior studies in 2017 her echocardiogram showed grossly normal left ventricular
function.
4.  Acute diverticulitis and hiatal hernia with Neel ulcers.  She is on
aggressive proton pump inhibitor therapy and colonoscopy is likely to be
performed.
 
 
 
 
 
<ELECTRONICALLY SIGNED>
                                        By:  Benitez Gauthier MD, FACC   
09/07/18     0914
D: 09/03/18 1126_______________________________________
T: 09/03/18 1406Benitez Gauthier MD, FACC      /nt
musculoskeletal

## 2018-09-09 VITALS — SYSTOLIC BLOOD PRESSURE: 156 MMHG | DIASTOLIC BLOOD PRESSURE: 83 MMHG

## 2018-09-09 VITALS — DIASTOLIC BLOOD PRESSURE: 65 MMHG | SYSTOLIC BLOOD PRESSURE: 136 MMHG

## 2018-09-09 LAB
ANION GAP SERPL CALC-SCNC: 12 MMOL/L (ref 7–16)
BUN SERPL-MCNC: 16 MG/DL (ref 7–18)
CALCIUM SERPL-MCNC: 8.2 MG/DL (ref 8.5–10.1)
CHLORIDE SERPL-SCNC: 101 MMOL/L (ref 98–107)
CO2 SERPL-SCNC: 23 MMOL/L (ref 21–32)
CREAT SERPL-MCNC: 1.4 MG/DL (ref 0.6–1.3)
GLUCOSE SERPL-MCNC: 98 MG/DL (ref 70–99)
POTASSIUM SERPL-SCNC: 3.5 MMOL/L (ref 3.5–5.1)
SODIUM SERPL-SCNC: 136 MMOL/L (ref 136–145)

## 2018-09-10 VITALS — SYSTOLIC BLOOD PRESSURE: 148 MMHG | DIASTOLIC BLOOD PRESSURE: 74 MMHG

## 2018-09-10 VITALS — DIASTOLIC BLOOD PRESSURE: 77 MMHG | SYSTOLIC BLOOD PRESSURE: 141 MMHG

## 2018-09-11 VITALS — DIASTOLIC BLOOD PRESSURE: 75 MMHG | SYSTOLIC BLOOD PRESSURE: 137 MMHG

## 2018-09-11 VITALS — SYSTOLIC BLOOD PRESSURE: 144 MMHG | DIASTOLIC BLOOD PRESSURE: 61 MMHG

## 2018-09-12 VITALS — SYSTOLIC BLOOD PRESSURE: 138 MMHG | DIASTOLIC BLOOD PRESSURE: 58 MMHG

## 2018-09-12 VITALS — SYSTOLIC BLOOD PRESSURE: 141 MMHG | DIASTOLIC BLOOD PRESSURE: 75 MMHG

## 2018-09-13 VITALS — DIASTOLIC BLOOD PRESSURE: 61 MMHG | SYSTOLIC BLOOD PRESSURE: 148 MMHG

## 2018-09-13 VITALS — SYSTOLIC BLOOD PRESSURE: 148 MMHG | DIASTOLIC BLOOD PRESSURE: 61 MMHG

## 2018-09-13 VITALS — SYSTOLIC BLOOD PRESSURE: 141 MMHG | DIASTOLIC BLOOD PRESSURE: 62 MMHG

## 2018-09-14 VITALS — SYSTOLIC BLOOD PRESSURE: 142 MMHG | DIASTOLIC BLOOD PRESSURE: 68 MMHG

## 2018-09-14 VITALS — DIASTOLIC BLOOD PRESSURE: 71 MMHG | SYSTOLIC BLOOD PRESSURE: 130 MMHG

## 2018-09-15 VITALS — DIASTOLIC BLOOD PRESSURE: 70 MMHG | SYSTOLIC BLOOD PRESSURE: 138 MMHG

## 2018-09-15 VITALS — SYSTOLIC BLOOD PRESSURE: 120 MMHG | DIASTOLIC BLOOD PRESSURE: 53 MMHG

## 2018-09-15 LAB
ALBUMIN SERPL-MCNC: 2.7 G/DL (ref 3.4–5)
ALP SERPL-CCNC: 98 U/L (ref 46–116)
ALT SERPL-CCNC: 10 U/L (ref 30–65)
ANION GAP SERPL CALC-SCNC: 8 MMOL/L (ref 7–16)
AST SERPL-CCNC: 13 U/L (ref 15–37)
BILIRUB SERPL-MCNC: 0.4 MG/DL
BUN SERPL-MCNC: 22 MG/DL (ref 7–18)
CALCIUM SERPL-MCNC: 8.4 MG/DL (ref 8.5–10.1)
CHLORIDE SERPL-SCNC: 91 MMOL/L (ref 98–107)
CO2 SERPL-SCNC: 26 MMOL/L (ref 21–32)
CREAT SERPL-MCNC: 1.5 MG/DL (ref 0.6–1.3)
GLUCOSE SERPL-MCNC: 142 MG/DL (ref 70–99)
HCT VFR BLD CALC: 27.1 % (ref 37–47)
HGB BLD-MCNC: 9.2 GM/DL (ref 12–15)
MCH RBC QN AUTO: 28.9 PG (ref 26–34)
MCHC RBC AUTO-ENTMCNC: 33.8 G/DL (ref 28–37)
MCV RBC: 85.5 FL (ref 80–100)
MPV: 8.4 FL. (ref 7.2–11.1)
PLATELET COUNT*: 301 THOU/UL (ref 150–400)
POTASSIUM SERPL-SCNC: 4.3 MMOL/L (ref 3.5–5.1)
PROT SERPL-MCNC: 5.9 G/DL (ref 6.4–8.2)
RBC # BLD AUTO: 3.18 MIL/UL (ref 4.2–5)
RDW-CV: 16.9 % (ref 10.5–14.5)
SODIUM SERPL-SCNC: 125 MMOL/L (ref 136–145)
WBC # BLD AUTO: 7.6 THOU/UL (ref 4–11)

## 2018-09-16 VITALS — DIASTOLIC BLOOD PRESSURE: 76 MMHG | SYSTOLIC BLOOD PRESSURE: 140 MMHG

## 2018-09-16 VITALS — DIASTOLIC BLOOD PRESSURE: 75 MMHG | SYSTOLIC BLOOD PRESSURE: 157 MMHG

## 2018-09-16 VITALS — SYSTOLIC BLOOD PRESSURE: 134 MMHG | DIASTOLIC BLOOD PRESSURE: 63 MMHG

## 2018-09-17 VITALS — DIASTOLIC BLOOD PRESSURE: 67 MMHG | SYSTOLIC BLOOD PRESSURE: 131 MMHG

## 2018-09-17 VITALS — SYSTOLIC BLOOD PRESSURE: 132 MMHG | DIASTOLIC BLOOD PRESSURE: 66 MMHG

## 2018-09-17 LAB
ALBUMIN SERPL-MCNC: 3.1 G/DL (ref 3.4–5)
ALP SERPL-CCNC: 102 U/L (ref 46–116)
ALT SERPL-CCNC: 11 U/L (ref 30–65)
ANION GAP SERPL CALC-SCNC: 8 MMOL/L (ref 7–16)
AST SERPL-CCNC: 15 U/L (ref 15–37)
BILIRUB SERPL-MCNC: 0.5 MG/DL
BUN SERPL-MCNC: 20 MG/DL (ref 7–18)
CALCIUM SERPL-MCNC: 8.8 MG/DL (ref 8.5–10.1)
CHLORIDE SERPL-SCNC: 84 MMOL/L (ref 98–107)
CO2 SERPL-SCNC: 25 MMOL/L (ref 21–32)
CREAT SERPL-MCNC: 1.5 MG/DL (ref 0.6–1.3)
GLUCOSE SERPL-MCNC: 142 MG/DL (ref 70–99)
LIPASE: 170 U/L (ref 73–393)
POTASSIUM SERPL-SCNC: 3.9 MMOL/L (ref 3.5–5.1)
PROT SERPL-MCNC: 7.1 G/DL (ref 6.4–8.2)
SODIUM SERPL-SCNC: 117 MMOL/L (ref 136–145)

## 2018-09-18 ENCOUNTER — HOSPITAL ENCOUNTER (OUTPATIENT)
Dept: HOSPITAL 96 - M.2W | Age: 83
Setting detail: OBSERVATION
LOS: 1 days | Discharge: STILL ADMITTED OR ADMINISTRATIVE | End: 2018-09-19
Attending: INTERNAL MEDICINE | Admitting: INTERNAL MEDICINE
Payer: MEDICARE

## 2018-09-18 VITALS — DIASTOLIC BLOOD PRESSURE: 70 MMHG | SYSTOLIC BLOOD PRESSURE: 138 MMHG

## 2018-09-18 VITALS — HEIGHT: 59.02 IN | BODY MASS INDEX: 26.47 KG/M2 | WEIGHT: 131.3 LBS

## 2018-09-18 VITALS — DIASTOLIC BLOOD PRESSURE: 55 MMHG | SYSTOLIC BLOOD PRESSURE: 138 MMHG

## 2018-09-18 VITALS — DIASTOLIC BLOOD PRESSURE: 66 MMHG | SYSTOLIC BLOOD PRESSURE: 149 MMHG

## 2018-09-18 VITALS — SYSTOLIC BLOOD PRESSURE: 123 MMHG | DIASTOLIC BLOOD PRESSURE: 71 MMHG

## 2018-09-18 DIAGNOSIS — Z98.890: ICD-10-CM

## 2018-09-18 DIAGNOSIS — Z90.49: ICD-10-CM

## 2018-09-18 DIAGNOSIS — R53.1: Primary | ICD-10-CM

## 2018-09-18 DIAGNOSIS — Z95.5: ICD-10-CM

## 2018-09-18 DIAGNOSIS — E87.1: ICD-10-CM

## 2018-09-18 DIAGNOSIS — Z87.19: ICD-10-CM

## 2018-09-18 DIAGNOSIS — I25.10: ICD-10-CM

## 2018-09-18 DIAGNOSIS — Z90.13: ICD-10-CM

## 2018-09-18 DIAGNOSIS — I10: ICD-10-CM

## 2018-09-18 LAB
ABSOLUTE EOSINOPHILS: 0.1 THOU/UL (ref 0–0.7)
ABSOLUTE MONOCYTES: 0.6 THOU/UL (ref 0–1.2)
ALBUMIN SERPL-MCNC: 2.9 G/DL (ref 3.4–5)
ALP SERPL-CCNC: 85 U/L (ref 46–116)
ALT SERPL-CCNC: 11 U/L (ref 30–65)
ANION GAP SERPL CALC-SCNC: 6 MMOL/L (ref 7–16)
ANISOCYTOSIS BLD QL SMEAR: (no result)
AST SERPL-CCNC: 16 U/L (ref 15–37)
BE: -5.2 MMOL/L
BILIRUB SERPL-MCNC: 0.4 MG/DL
BUN SERPL-MCNC: 25 MG/DL (ref 7–18)
CALCIUM SERPL-MCNC: 8 MG/DL (ref 8.5–10.1)
CHLORIDE SERPL-SCNC: 89 MMOL/L (ref 98–107)
CO2 SERPL-SCNC: 25 MMOL/L (ref 21–32)
CREAT SERPL-MCNC: 1.4 MG/DL (ref 0.6–1.3)
EOSINOPHIL NFR BLD: 1 %
ESR (SEDRATE): 42 MM/HR (ref 0–30)
GLUCOSE SERPL-MCNC: 152 MG/DL (ref 70–99)
GRANULOCYTES NFR BLD MANUAL: 91 %
HCO3 BLD-SCNC: 17.2 MMOL/L (ref 22–26)
HCT VFR BLD CALC: 25.7 % (ref 37–47)
HGB BLD-MCNC: 9.1 GM/DL (ref 12–15)
LYMPHOCYTES # BLD: 0.2 THOU/UL (ref 0.8–5.3)
LYMPHOCYTES NFR BLD AUTO: 2 %
MCH RBC QN AUTO: 30 PG (ref 26–34)
MCHC RBC AUTO-ENTMCNC: 35.3 G/DL (ref 28–37)
MCV RBC: 85.1 FL (ref 80–100)
MONOCYTES NFR BLD: 6 %
MPV: 8.3 FL. (ref 7.2–11.1)
NEUTROPHILS # BLD: 8.6 THOU/UL (ref 1.6–8.1)
NUCLEATED RBCS: 0 /100WBC
PCO2 BLD: 24.4 MMHG (ref 35–45)
PLATELET # BLD EST: ADEQUATE 10*3/UL
PLATELET COUNT*: 275 THOU/UL (ref 150–400)
PO2 BLD: 67.1 MMHG (ref 75–100)
POTASSIUM SERPL-SCNC: 3.9 MMOL/L (ref 3.5–5.1)
PROT SERPL-MCNC: 6.3 G/DL (ref 6.4–8.2)
RBC # BLD AUTO: 3.03 MIL/UL (ref 4.2–5)
RDW-CV: 17.2 % (ref 10.5–14.5)
SODIUM SERPL-SCNC: 120 MMOL/L (ref 136–145)
WBC # BLD AUTO: 9.5 THOU/UL (ref 4–11)

## 2018-09-18 NOTE — NUR
PT ADMITTED FROM REHAB WITH . ALERT AND ORIENTED X4, FORGETFUL AND
ANXIOUS AT TIMES. PT SOA ON ARRIVAL THAT HAS WORSENED.  CRACKLES IN LUNG
BASES. RESPIRATIONS EVEN AND LABORED AT REST, SAT MID 90S RA.   MADE AWARE.
TELE SR HRR 80'S. PT UP WITH STEADY GAIT, SBA. PT ORIENTED TO ROOM, ABLE TO
MAKE NEEDS KNOWN, CALL LIGHT IN REACH

## 2018-09-19 ENCOUNTER — HOSPITAL ENCOUNTER (INPATIENT)
Dept: HOSPITAL 96 - M.REH | Age: 83
LOS: 9 days | Discharge: HOME HEALTH | DRG: 948 | End: 2018-09-28
Attending: PHYSICAL MEDICINE & REHABILITATION | Admitting: PHYSICAL MEDICINE & REHABILITATION
Payer: MEDICARE

## 2018-09-19 VITALS — DIASTOLIC BLOOD PRESSURE: 54 MMHG | SYSTOLIC BLOOD PRESSURE: 146 MMHG

## 2018-09-19 VITALS — DIASTOLIC BLOOD PRESSURE: 67 MMHG | SYSTOLIC BLOOD PRESSURE: 133 MMHG

## 2018-09-19 VITALS — SYSTOLIC BLOOD PRESSURE: 149 MMHG | DIASTOLIC BLOOD PRESSURE: 63 MMHG

## 2018-09-19 VITALS — DIASTOLIC BLOOD PRESSURE: 73 MMHG | SYSTOLIC BLOOD PRESSURE: 133 MMHG

## 2018-09-19 VITALS — DIASTOLIC BLOOD PRESSURE: 54 MMHG | SYSTOLIC BLOOD PRESSURE: 135 MMHG

## 2018-09-19 VITALS — SYSTOLIC BLOOD PRESSURE: 129 MMHG | DIASTOLIC BLOOD PRESSURE: 66 MMHG

## 2018-09-19 VITALS — HEIGHT: 59.02 IN | WEIGHT: 127 LBS | BODY MASS INDEX: 25.6 KG/M2

## 2018-09-19 DIAGNOSIS — Z88.2: ICD-10-CM

## 2018-09-19 DIAGNOSIS — Z80.0: ICD-10-CM

## 2018-09-19 DIAGNOSIS — R53.1: Primary | ICD-10-CM

## 2018-09-19 DIAGNOSIS — Z79.82: ICD-10-CM

## 2018-09-19 DIAGNOSIS — Z87.01: ICD-10-CM

## 2018-09-19 DIAGNOSIS — I12.9: ICD-10-CM

## 2018-09-19 DIAGNOSIS — K57.90: ICD-10-CM

## 2018-09-19 DIAGNOSIS — D64.9: ICD-10-CM

## 2018-09-19 DIAGNOSIS — Z88.0: ICD-10-CM

## 2018-09-19 DIAGNOSIS — Z95.1: ICD-10-CM

## 2018-09-19 DIAGNOSIS — Z85.3: ICD-10-CM

## 2018-09-19 DIAGNOSIS — B02.9: ICD-10-CM

## 2018-09-19 DIAGNOSIS — Z90.13: ICD-10-CM

## 2018-09-19 DIAGNOSIS — Z91.040: ICD-10-CM

## 2018-09-19 DIAGNOSIS — E87.1: ICD-10-CM

## 2018-09-19 DIAGNOSIS — R10.9: ICD-10-CM

## 2018-09-19 DIAGNOSIS — N18.3: ICD-10-CM

## 2018-09-19 DIAGNOSIS — E86.0: ICD-10-CM

## 2018-09-19 DIAGNOSIS — Z79.899: ICD-10-CM

## 2018-09-19 DIAGNOSIS — Z90.49: ICD-10-CM

## 2018-09-19 DIAGNOSIS — I25.10: ICD-10-CM

## 2018-09-19 DIAGNOSIS — G89.29: ICD-10-CM

## 2018-09-19 DIAGNOSIS — Z87.440: ICD-10-CM

## 2018-09-19 LAB
BACTERIA #/AREA URNS HPF: (no result) /HPF
BILIRUB UR-MCNC: NEGATIVE MG/DL
COLOR UR: YELLOW
KETONES UR STRIP-MCNC: NEGATIVE MG/DL
MUCUS: (no result) STRN/LPF
NITRITE UR QL STRIP: NEGATIVE
PROT UR QL STRIP: (no result)
RBC # UR STRIP: NEGATIVE /UL
RBC #/AREA URNS HPF: (no result) /HPF (ref 0–2)
SP GR UR STRIP: <= 1.005 (ref 1–1.03)
SQUAMOUS: (no result) /LPF (ref 0–3)
URINE CLARITY: CLEAR
URINE GLUCOSE-RANDOM: NEGATIVE
URINE LEUKOCYTES: (no result)
UROBILINOGEN UR STRIP-ACNC: 0.2 E.U./DL (ref 0.2–1)
WBC #/AREA URNS HPF: (no result) /HPF (ref 0–5)

## 2018-09-19 NOTE — NUR
PT IS ABLE TO COMMUNICATE HER NEEDS TO STAFF EFFECTIVELY; SHE IS A LITTLE
HARD-OF-HEARING. SHE HAS DENIED THE NEED FOR PAIN MEDICATION UP TO THIS TIME.
CONTACT ISOLATION FOR OLD SCABBED OVER SHINGLES ON HER RIGHT TORSO MAINTAINED.
NEPHROLOGY CONSULT ORDERED.

## 2018-09-19 NOTE — NUR
ASSUMED PT CARE AT 0730, FULL ASSESMENT DONE AS CHARTED. PT A/O X4, VERY [place],
C/O SOME PAIN IN RIGHT FLANK FROM SHINGLES, THEY ARE SCABBED OVER, DRY. PT
DENIES THE NEED FOR ANY PAIN MEDS WHEN ASKED SEVERAL TIMES THROUGHOUT THE
SHIFT. PT UP WITH ASSIST, FEELING VERY TIRED. PTS VSS, SR/1ST AVB ON THE
MONITOR. REVCIEVED OK TO DISCHARGE BACK TO REHAB TODAY. DISCHARGE ORDERS
REVIEWED WITH PT. REPORT GIVEN TO GERMAINE MONREAL ON REHAB. PT TRANSFERED TO ROOM 328
AT APPROX 1650 WITH STAFF

## 2018-09-20 VITALS — SYSTOLIC BLOOD PRESSURE: 150 MMHG | DIASTOLIC BLOOD PRESSURE: 82 MMHG

## 2018-09-20 VITALS — DIASTOLIC BLOOD PRESSURE: 60 MMHG | SYSTOLIC BLOOD PRESSURE: 127 MMHG

## 2018-09-20 LAB
ANION GAP SERPL CALC-SCNC: 10 MMOL/L (ref 7–16)
BUN SERPL-MCNC: 21 MG/DL (ref 7–18)
CALCIUM SERPL-MCNC: 8.3 MG/DL (ref 8.5–10.1)
CHLORIDE SERPL-SCNC: 93 MMOL/L (ref 98–107)
CO2 SERPL-SCNC: 23 MMOL/L (ref 21–32)
CREAT SERPL-MCNC: 1.3 MG/DL (ref 0.6–1.3)
GLUCOSE SERPL-MCNC: 94 MG/DL (ref 70–99)
HCT VFR BLD CALC: 26.2 % (ref 37–47)
HGB BLD-MCNC: 9 GM/DL (ref 12–15)
MCH RBC QN AUTO: 30.3 PG (ref 26–34)
MCHC RBC AUTO-ENTMCNC: 34.5 G/DL (ref 28–37)
MCV RBC: 87.9 FL (ref 80–100)
MPV: 8.9 FL. (ref 7.2–11.1)
PLATELET COUNT*: 276 THOU/UL (ref 150–400)
POTASSIUM SERPL-SCNC: 4.4 MMOL/L (ref 3.5–5.1)
RBC # BLD AUTO: 2.98 MIL/UL (ref 4.2–5)
RDW-CV: 17.6 % (ref 10.5–14.5)
SODIUM SERPL-SCNC: 126 MMOL/L (ref 136–145)
WBC # BLD AUTO: 8.8 THOU/UL (ref 4–11)

## 2018-09-20 NOTE — H
[address]  [address]                    HISTORY AND PHYSICAL          
_______________________________________________________________________________
 
Name:       KRISTIN ROMO                  Room:           [address] [place]  
.R.#:  [id number]      Account #:      [id number]  
Admission:  09/07/18     Attend Phys:    Dimple Romero DO   
Discharge:  09/18/18     Date of Birth:  [birth year]  
         Report #: [id number]
                                                                     [id number]  
_______________________________________________________________________________
THIS REPORT FOR:  //name//                      
 
CC: Nevin Romero
 
DATE OF SERVICE:  09/07/2018
 
 
HISTORY OF PRESENT ILLNESS:  This is a female admitted to inpatient
rehabilitation status post acute hospitalization for hepatic flexure
diverticulitis and encephalopathy and UTI.
Multiple medical co-morbid conditions including diabetic vasculopathy
requiring daily medical care.  Needs in PT, OT and SLP.
 
No significant changes since
preadmission screening.  Previous level of function is independent-to-modified
independent with activities of daily living.  Current level of function is
minimum-to-moderate assistance of 1-2 depending on therapy, activity and time of
day.
 
Estimated length of stay is 16-17 days, with discharge disposition to the home
setting with supportive family and an accessible home.
 
PMH:
HTN
CAD
DM, UNCONTROLLED
UTI
ANEMIA HGB 9.3
DYSURIA
CKD
B/L BREAST CANCER
GERD
SEPSIS
NAUSEA AND VOMITING
ACUTE RENAL FILURE
 
PSH:
APPENDECTOMY
B/L MASTECTOMIES
CABG
CHOLECYSTECTOMY
 
Allergies:
Latex, PNC, Sulfa
 
 
 
 
[address] [place]  [address]                    HISTORY AND PHYSICAL          
_______________________________________________________________________________
 
Name:       KRISTIN ROMO                  Room:           [address] [place]  
[facility]#:  [id number]      Account #:      [id number]  
Admission:  09/07/18     Attend Phys:    Dimple Romero DO   
Discharge:  09/18/18     Date of Birth:  [birth year]  
         Report #: [id number]
                                                                     [id number]  
_______________________________________________________________________________
Social Hx: no tobacco, alcohol or illicity drug use
 
Medications: reviewed, reconciled and in the MAR
 
Family Hx: cardiac disease and DM
 
ROS: 14 point completed and negative except as mentioned in the HPI
 
Physical Exam:
AA NAD VSS
CV reg
Pulmonary symmetric expansion
Neuro: 5/5 BLLE, BLUE
MSK: no CCE
Skin: warm and dry
 
Assessment:
Diverticulitis
Encephalopathy
UTI
DM Vasculopathy
 
Plan:
PT, OT, SLP
HMS, CM, RN to make evaluations
HP/ POC (pending)
team weekly
 
 
 
 
 
 
 
 
 
 
 
 
 
 
 
 
 
<ELECTRONICALLY SIGNED>
                                        By:  Dimple Romero DO            
09/20/18     1319
D: 09/08/18 1130_______________________________________
T: 09/08/18 1251Dimple Romero DO               /nt

## 2018-09-21 VITALS — SYSTOLIC BLOOD PRESSURE: 149 MMHG | DIASTOLIC BLOOD PRESSURE: 77 MMHG

## 2018-09-21 VITALS — DIASTOLIC BLOOD PRESSURE: 60 MMHG | SYSTOLIC BLOOD PRESSURE: 143 MMHG

## 2018-09-21 VITALS — DIASTOLIC BLOOD PRESSURE: 77 MMHG | SYSTOLIC BLOOD PRESSURE: 146 MMHG

## 2018-09-22 VITALS — SYSTOLIC BLOOD PRESSURE: 152 MMHG | DIASTOLIC BLOOD PRESSURE: 68 MMHG

## 2018-09-22 VITALS — DIASTOLIC BLOOD PRESSURE: 67 MMHG | SYSTOLIC BLOOD PRESSURE: 143 MMHG

## 2018-09-23 VITALS — DIASTOLIC BLOOD PRESSURE: 73 MMHG | SYSTOLIC BLOOD PRESSURE: 134 MMHG

## 2018-09-23 VITALS — SYSTOLIC BLOOD PRESSURE: 136 MMHG | DIASTOLIC BLOOD PRESSURE: 60 MMHG

## 2018-09-23 LAB
ANION GAP SERPL CALC-SCNC: 6 MMOL/L (ref 7–16)
BUN SERPL-MCNC: 22 MG/DL (ref 7–18)
CALCIUM SERPL-MCNC: 8.3 MG/DL (ref 8.5–10.1)
CHLORIDE SERPL-SCNC: 87 MMOL/L (ref 98–107)
CO2 SERPL-SCNC: 27 MMOL/L (ref 21–32)
CREAT SERPL-MCNC: 1.6 MG/DL (ref 0.6–1.3)
GLUCOSE SERPL-MCNC: 121 MG/DL (ref 70–99)
POTASSIUM SERPL-SCNC: 4.5 MMOL/L (ref 3.5–5.1)
SODIUM SERPL-SCNC: 120 MMOL/L (ref 136–145)

## 2018-09-24 VITALS — SYSTOLIC BLOOD PRESSURE: 144 MMHG | DIASTOLIC BLOOD PRESSURE: 83 MMHG

## 2018-09-24 VITALS — SYSTOLIC BLOOD PRESSURE: 149 MMHG | DIASTOLIC BLOOD PRESSURE: 70 MMHG

## 2018-09-24 LAB
ANION GAP SERPL CALC-SCNC: 6 MMOL/L (ref 7–16)
BILIRUB UR-MCNC: NEGATIVE MG/DL
BUN SERPL-MCNC: 24 MG/DL (ref 7–18)
CALCIUM SERPL-MCNC: 8.4 MG/DL (ref 8.5–10.1)
CHLORIDE SERPL-SCNC: 91 MMOL/L (ref 98–107)
CO2 SERPL-SCNC: 28 MMOL/L (ref 21–32)
COLOR UR: YELLOW
CREAT SERPL-MCNC: 1.5 MG/DL (ref 0.6–1.3)
GLUCOSE SERPL-MCNC: 93 MG/DL (ref 70–99)
KETONES UR STRIP-MCNC: NEGATIVE MG/DL
NITRITE UR QL STRIP: NEGATIVE
POTASSIUM SERPL-SCNC: 4.6 MMOL/L (ref 3.5–5.1)
PROT UR QL STRIP: (no result)
RBC # UR STRIP: NEGATIVE /UL
SODIUM SERPL-SCNC: 125 MMOL/L (ref 136–145)
SP GR UR STRIP: 1.01 (ref 1–1.03)
URINE CHLORIDE-RANDOM*: 35 MMOL/L
URINE CLARITY: CLEAR
URINE GLUCOSE-RANDOM: NEGATIVE
URINE LEUKOCYTES: NEGATIVE
URINE POTASSIUM-RANDOM: 27 MMOL/L
UROBILINOGEN UR STRIP-ACNC: 0.2 E.U./DL (ref 0.2–1)

## 2018-09-24 NOTE — CON
[address]  [address]                    CONSULTATION                  
_______________________________________________________________________________
 
Name:       KRISTIN ROMO                  Room:           [address] [place]  
.R.#:  [id number]      Account #:      [id number]  
Admission:  09/19/18     Attend Phys:    Dimple Romero DO   
Discharge:               Date of Birth:  [birth year]  
         Report #: [id number]
                                                                     [id number]  
_______________________________________________________________________________
THIS REPORT FOR:  //name//                      
 
CC: Nevin Romero
 
DATE OF SERVICE:  09/19/2018
 
 
REQUESTING PHYSICIAN:  Helga Calzada M.D.
 
REASON FOR CONSULTATION:  Hyponatremia.
 
HISTORY OF PRESENT ILLNESS:  The patient is an 86-year-old female who was
transferred here for evaluation of hyponatremia from rehabilitation.
 
She has history of hyponatremia in the past and they had the numbers were low in
the past, we did see her in 2017.  At that time, assessment was hyponatremia,
probably due to over production of ADH.
 
At this time, her serum sodium also dropped down to 117 after she was given
normal saline.  She has multiple medical problems including chronic abdominal
pain, coronary artery disease, chronic kidney disease, diverticulitis, history
of hypertension, history of recurrent urinary tract infection, history of
recurrent hyponatremia.  She also has a history of bilateral mastectomies for
breast cancer 12 years ago.
 
SOCIAL HISTORY:  No tobacco or alcohol abuse.
 
MEDICATIONS:  Reviewed from my standpoint, she was on hydrochlorothiazide.
 
FAMILY HISTORY:  Noncontributory to this 86-year-old female.
 
PHYSICAL EXAMINATION:
GENERAL:  Awake, alert, oriented.
VITAL SIGNS:  Blood pressure 149/63, heart rate 79, afebrile.
HEENT:  Pupils are round.
NECK:  Supple.
LUNGS:  Clear.
CARDIOVASCULAR:  Regular rate.
ABDOMEN:  Soft.
LOWER EXTREMITIES:  No edema.
 
LABORATORY DATA:  This morning, serum sodium of 127, potassium 3.9, chloride 90,
carbon dioxide 25, BUN 25, creatinine 1.4, the urine sodium was 30.  Urinalysis
pending.
 
 
 
 
[place] [address]                    CONSULTATION                  
_______________________________________________________________________________
 
Name:       KRISTIN ROMO                  Room:           [address] [place]  
[facility]#:  [id number]      Account #:      [id number]  
Admission:  09/19/18     Attend Phys:    Dimple Romero DO   
Discharge:               Date of Birth:  [birth year]  
         Report #: [id number]
                                                                     [id number]  
_______________________________________________________________________________
ASSESSMENT:  An 86-year-old female with hyponatremia.  It is difficult to say
the etiology of that, but probably was overproduction of ADH because her serum
sodium actually dropped after she was given normal saline with a history of the
breast cancer even it was 12 years ago, but still make sure she does not have
any breast cancer recurrence.
 
PLAN:  She is an 86-year-old and not sure what kind of options will be available
later.  At hydrochlorothiazide was appropriately stopped.  I will keep her on a
fluid restriction and monitor serum sodium very carefully.  Chronic kidney
disease stage 3.  I discussed this case with Dr. Vinson.
 
 
 
 
 
 
 
 
 
 
 
 
 
 
 
 
 
 
 
 
 
 
 
 
 
 
 
 
 
 
 
 
 
 
<ELECTRONICALLY SIGNED>
                                        By:  Joe Gonsalves MD      
09/24/18     1108
D: 09/19/18 1207_______________________________________
T: 09/19/18 1525Alexen Gonsalves, MD         /nt

## 2018-09-25 VITALS — DIASTOLIC BLOOD PRESSURE: 61 MMHG | SYSTOLIC BLOOD PRESSURE: 138 MMHG

## 2018-09-25 VITALS — SYSTOLIC BLOOD PRESSURE: 137 MMHG | DIASTOLIC BLOOD PRESSURE: 76 MMHG

## 2018-09-25 LAB
ANION GAP SERPL CALC-SCNC: 7 MMOL/L (ref 7–16)
BUN SERPL-MCNC: 23 MG/DL (ref 7–18)
CALCIUM SERPL-MCNC: 8.5 MG/DL (ref 8.5–10.1)
CHLORIDE SERPL-SCNC: 95 MMOL/L (ref 98–107)
CO2 SERPL-SCNC: 27 MMOL/L (ref 21–32)
CREAT SERPL-MCNC: 1.5 MG/DL (ref 0.6–1.3)
GLUCOSE SERPL-MCNC: 83 MG/DL (ref 70–99)
POTASSIUM SERPL-SCNC: 4.2 MMOL/L (ref 3.5–5.1)
SODIUM SERPL-SCNC: 129 MMOL/L (ref 136–145)

## 2018-09-26 VITALS — SYSTOLIC BLOOD PRESSURE: 144 MMHG | DIASTOLIC BLOOD PRESSURE: 67 MMHG

## 2018-09-26 VITALS — SYSTOLIC BLOOD PRESSURE: 138 MMHG | DIASTOLIC BLOOD PRESSURE: 58 MMHG

## 2018-09-26 LAB
ANION GAP SERPL CALC-SCNC: 8 MMOL/L (ref 7–16)
BUN SERPL-MCNC: 25 MG/DL (ref 7–18)
CALCIUM SERPL-MCNC: 8.5 MG/DL (ref 8.5–10.1)
CHLORIDE SERPL-SCNC: 96 MMOL/L (ref 98–107)
CO2 SERPL-SCNC: 28 MMOL/L (ref 21–32)
CREAT SERPL-MCNC: 1.6 MG/DL (ref 0.6–1.3)
GLUCOSE SERPL-MCNC: 101 MG/DL (ref 70–99)
POTASSIUM SERPL-SCNC: 4.6 MMOL/L (ref 3.5–5.1)
SODIUM SERPL-SCNC: 132 MMOL/L (ref 136–145)

## 2018-09-27 VITALS — SYSTOLIC BLOOD PRESSURE: 132 MMHG | DIASTOLIC BLOOD PRESSURE: 60 MMHG

## 2018-09-27 VITALS — SYSTOLIC BLOOD PRESSURE: 144 MMHG | DIASTOLIC BLOOD PRESSURE: 60 MMHG

## 2018-09-27 LAB
ANION GAP SERPL CALC-SCNC: 7 MMOL/L (ref 7–16)
BUN SERPL-MCNC: 27 MG/DL (ref 7–18)
CALCIUM SERPL-MCNC: 9.2 MG/DL (ref 8.5–10.1)
CHLORIDE SERPL-SCNC: 99 MMOL/L (ref 98–107)
CO2 SERPL-SCNC: 28 MMOL/L (ref 21–32)
CREAT SERPL-MCNC: 1.5 MG/DL (ref 0.6–1.3)
GLUCOSE SERPL-MCNC: 85 MG/DL (ref 70–99)
POTASSIUM SERPL-SCNC: 4.6 MMOL/L (ref 3.5–5.1)
SODIUM SERPL-SCNC: 134 MMOL/L (ref 136–145)

## 2018-09-28 VITALS — SYSTOLIC BLOOD PRESSURE: 144 MMHG | DIASTOLIC BLOOD PRESSURE: 60 MMHG

## 2018-09-28 VITALS — DIASTOLIC BLOOD PRESSURE: 63 MMHG | SYSTOLIC BLOOD PRESSURE: 149 MMHG

## 2018-09-28 VITALS — DIASTOLIC BLOOD PRESSURE: 60 MMHG | SYSTOLIC BLOOD PRESSURE: 144 MMHG

## 2018-10-15 NOTE — D
[address]  [address]                    DISCHARGE SUMMARY             
_______________________________________________________________________________
 
Name:       KRISTIN ROMO                  Room:           [address] [place]  
M.R.#:  [id number]      Account #:      [id number]  
Admission:  09/19/18     Attend Phys:    Dimple Romero,    
Discharge:  09/28/18     Date of Birth:  [birth year]  
         Report #: [id number]
                                                                     [id number]  
_______________________________________________________________________________
THIS REPORT FOR:  //name//                      
 
CC: Nevin Romero
 
DISCHARGE DIAGNOSIS:  Encephalopathy.
 
DISCHARGE DISPOSITION:  To home with home health PT, OT and nursing.  She will
follow with her primary care physician in 1 week.
 
Notifications for physician were given.
 
She will maintain a heart-healthy diet.  Monitor weight gain and maintain fall
precautions.
 
MEDICATIONS:  Reviewed and reconciled by myself and are available in the MAR.
 
One medication change was initiated as her Lexapro was increased to 20; however,
on discharge it was replaced back to the 10 mg daily as opposed to 20.
 
She did have some sustained hyponatremia.  She had an interrupted stay where she
went down to acute for a short time to be evaluated a little bit more closely
for that.  Otherwise, she did well with her rehabilitation process.
 
DISCHARGE PHYSICAL EXAMINATION:
GENERAL:  Alert, oriented, no apparent distress.
VITAL SIGNS:  Reviewed and are stable.
HEENT:  Head atraumatic, normocephalic.  Pupils equal, round, reactive.
ABDOMEN:  Soft, nontender, nondistended.
NEUROLOGIC:  Cranial nerves 2-12 are grossly intact with no focal neuro
deficits, 5/5 strength in the bilateral upper and lower extremities.
SKIN:  Warm and dry.  No rashes or lesions noted.
 
The patient's sister is going to come stay with her upon discharge for
supervision and safety until she is settled in her independent living space.
 
 
 
 
 
 
 
 
 
<ELECTRONICALLY SIGNED>
                                        By:  Dimple Romero DO            
10/15/18     1019
D: 09/27/18 1348_______________________________________
T: 09/27/18 1414Dimple Romero DO               /nt

## 2018-10-25 NOTE — D
[address]  [address]                    DISCHARGE SUMMARY             
_______________________________________________________________________________
 
Name:       KRISTIN ROMO                  Room:           [address] [place]  
M.R.#:  [id number]      Account #:      [id number]  
Admission:  09/07/18     Attend Phys:    Dimple Romero DO   
Discharge:  09/18/18     Date of Birth:  [birth year]  
         Report #: [id number]
                                                                     [id number]  
_______________________________________________________________________________
THIS REPORT FOR:  //name//                      
 
CC: Nevin Romero
 
DATE OF SERVICE:  09/18/2018
 
 
HOSPITAL COURSE:  The patient was admitted with encephalopathy, complicated
urinary tract infection, hepatic flexure diverticulitis with multiple medical
comorbid conditions.  She was noted to have ongoing hyponatremia and after being
seen by Internal Medicine it was decided she needed to be closely monitored on
tele, so she was discharged urgently after rounds had been completed.
 
MEDICATIONS:  Reviewed and reconciled by Internal Medicine.
 
Due to the urgency of her discharge, physical exam was not completed, but we
will follow her on telemetry and we will anticipate return to acute inpatient
rehabilitation once medically stable.
 
 
 
 
 
 
 
 
 
 
 
 
 
 
 
 
 
 
 
 
 
 
 
 
 
<ELECTRONICALLY SIGNED>
                                        By:  Dimple Romero DO            
10/25/18     1039
D: 09/20/18 1347_______________________________________
T: 09/20/18 1358Dimple Romero DO               /nt

## 2018-10-27 ENCOUNTER — HOSPITAL ENCOUNTER (INPATIENT)
Dept: HOSPITAL 96 - M.ERS | Age: 83
LOS: 6 days | Discharge: NURSING, REHAB OR LONG-TERM CARE | DRG: 480 | End: 2018-11-02
Attending: FAMILY MEDICINE | Admitting: FAMILY MEDICINE
Payer: MEDICARE

## 2018-10-27 VITALS — WEIGHT: 127 LBS | HEIGHT: 59.02 IN | BODY MASS INDEX: 25.6 KG/M2

## 2018-10-27 VITALS — SYSTOLIC BLOOD PRESSURE: 164 MMHG | DIASTOLIC BLOOD PRESSURE: 87 MMHG

## 2018-10-27 DIAGNOSIS — E87.1: ICD-10-CM
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
## 2018-10-27 DIAGNOSIS — Z91.040: ICD-10-CM

## 2018-10-27 DIAGNOSIS — D64.9: ICD-10-CM

## 2018-10-27 DIAGNOSIS — N18.9: ICD-10-CM

## 2018-10-27 DIAGNOSIS — I27.20: ICD-10-CM

## 2018-10-27 DIAGNOSIS — K57.90: ICD-10-CM

## 2018-10-27 DIAGNOSIS — F41.9: ICD-10-CM

## 2018-10-27 DIAGNOSIS — S72.142A: Primary | ICD-10-CM

## 2018-10-27 DIAGNOSIS — I13.0: ICD-10-CM
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
## 2018-10-27 DIAGNOSIS — N17.9: ICD-10-CM

## 2018-10-27 DIAGNOSIS — Z88.0: ICD-10-CM

## 2018-10-27 DIAGNOSIS — Z88.2: ICD-10-CM

## 2018-10-27 DIAGNOSIS — I21.4: ICD-10-CM

## 2018-10-27 DIAGNOSIS — Z85.3: ICD-10-CM

## 2018-10-27 DIAGNOSIS — I25.10: ICD-10-CM

## 2018-10-27 DIAGNOSIS — I07.1: ICD-10-CM

## 2018-10-27 DIAGNOSIS — Z90.49: ICD-10-CM

## 2018-10-27 DIAGNOSIS — I50.43: ICD-10-CM
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
## 2018-10-27 DIAGNOSIS — J96.91: ICD-10-CM

## 2018-10-27 DIAGNOSIS — Z95.1: ICD-10-CM

## 2018-10-27 DIAGNOSIS — E87.70: ICD-10-CM

## 2018-10-27 DIAGNOSIS — Z90.13: ICD-10-CM

## 2018-10-27 LAB
%HYPO/RBC NFR BLD AUTO: (no result) %
ABSOLUTE MONOCYTES: 0.2 THOU/UL (ref 0–1.2)
ALBUMIN SERPL-MCNC: 3.6 G/DL (ref 3.4–5)
ALP SERPL-CCNC: 93 U/L (ref 46–116)
ALT SERPL-CCNC: 17 U/L (ref 30–65)
ANION GAP SERPL CALC-SCNC: 15 MMOL/L (ref 7–16)
ANISOCYTOSIS BLD QL SMEAR: (no result)
AST SERPL-CCNC: 26 U/L (ref 15–37)
BE: -6.9 MMOL/L
BILIRUB SERPL-MCNC: 0.9 MG/DL
BUN SERPL-MCNC: 24 MG/DL (ref 7–18)
CALCIUM SERPL-MCNC: 9 MG/DL (ref 8.5–10.1)
CHLORIDE SERPL-SCNC: 97 MMOL/L (ref 98–107)
CO2 SERPL-SCNC: 18 MMOL/L (ref 21–32)
CREAT SERPL-MCNC: 1.6 MG/DL (ref 0.6–1.3)
GLUCOSE SERPL-MCNC: 170 MG/DL (ref 70–99)
GRANULOCYTES NFR BLD MANUAL: 89 %
HCO3 BLD-SCNC: 17.8 MMOL/L (ref 22–26)
HCT VFR BLD CALC: 29.5 % (ref 37–47)
HGB BLD-MCNC: 9.6 GM/DL (ref 12–15)
INR PPP: 1.3
LYMPHOCYTES # BLD: 0.1 THOU/UL (ref 0.8–5.3)
LYMPHOCYTES NFR BLD AUTO: 1 %
MCH RBC QN AUTO: 27.7 PG (ref 26–34)
MCHC RBC AUTO-ENTMCNC: 32.7 G/DL (ref 28–37)
MCV RBC: 84.9 FL (ref 80–100)
MONOCYTES NFR BLD: 2 %
MPV: 8 FL. (ref 7.2–11.1)
NEUTROPHILS # BLD: 10.2 THOU/UL (ref 1.6–8.1)
NEUTS BAND NFR BLD: 8 %
NT-PRO BRAIN NAT PEPTIDE: (no result) PG/ML (ref ?–300)
NUCLEATED RBCS: 0 /100WBC
PCO2 BLD: 32.9 MMHG (ref 35–45)
PLATELET # BLD EST: ADEQUATE 10*3/UL
PLATELET COUNT*: 253 THOU/UL (ref 150–400)
PO2 BLD: 31 MMHG (ref 75–100)
POTASSIUM SERPL-SCNC: 4.1 MMOL/L (ref 3.5–5.1)
PROT SERPL-MCNC: 7 G/DL (ref 6.4–8.2)
PROTHROMBIN TIME: 12.8 SECONDS (ref 9.2–11.5)
RBC # BLD AUTO: 3.47 MIL/UL (ref 4.2–5)
RDW-CV: 18 % (ref 10.5–14.5)
SODIUM SERPL-SCNC: 130 MMOL/L (ref 136–145)
TROPONIN-I LEVEL: 0.11 NG/ML (ref ?–0.06)
WBC # BLD AUTO: 10.5 THOU/UL (ref 4–11)

## 2018-10-28 VITALS — SYSTOLIC BLOOD PRESSURE: 131 MMHG | DIASTOLIC BLOOD PRESSURE: 86 MMHG

## 2018-10-28 VITALS — DIASTOLIC BLOOD PRESSURE: 61 MMHG | SYSTOLIC BLOOD PRESSURE: 127 MMHG

## 2018-10-28 VITALS — DIASTOLIC BLOOD PRESSURE: 68 MMHG | SYSTOLIC BLOOD PRESSURE: 129 MMHG

## 2018-10-28 VITALS — SYSTOLIC BLOOD PRESSURE: 157 MMHG | DIASTOLIC BLOOD PRESSURE: 53 MMHG

## 2018-10-28 VITALS — SYSTOLIC BLOOD PRESSURE: 103 MMHG | DIASTOLIC BLOOD PRESSURE: 76 MMHG

## 2018-10-28 VITALS — SYSTOLIC BLOOD PRESSURE: 128 MMHG | DIASTOLIC BLOOD PRESSURE: 64 MMHG

## 2018-10-28 LAB
ALBUMIN SERPL-MCNC: 3.5 G/DL (ref 3.4–5)
ALP SERPL-CCNC: 88 U/L (ref 46–116)
ALT SERPL-CCNC: 23 U/L (ref 30–65)
ANION GAP SERPL CALC-SCNC: 12 MMOL/L (ref 7–16)
AST SERPL-CCNC: 36 U/L (ref 15–37)
BACTERIA-REFLEX: (no result) /HPF
BE: -5.8 MMOL/L
BE: -8.6 MMOL/L
BILIRUB SERPL-MCNC: 0.6 MG/DL
BILIRUB UR-MCNC: NEGATIVE MG/DL
BUN SERPL-MCNC: 25 MG/DL (ref 7–18)
CALCIUM SERPL-MCNC: 8.6 MG/DL (ref 8.5–10.1)
CHLORIDE SERPL-SCNC: 99 MMOL/L (ref 98–107)
CO2 SERPL-SCNC: 20 MMOL/L (ref 21–32)
COLOR UR: YELLOW
CREAT SERPL-MCNC: 1.7 MG/DL (ref 0.6–1.3)
GLUCOSE SERPL-MCNC: 152 MG/DL (ref 70–99)
HCO3 BLD-SCNC: 16.9 MMOL/L (ref 22–26)
HCO3 BLD-SCNC: 18.1 MMOL/L (ref 22–26)
HCT VFR BLD CALC: 28.8 % (ref 37–47)
HGB BLD-MCNC: 9.4 GM/DL (ref 12–15)
KETONES UR STRIP-MCNC: NEGATIVE MG/DL
MCH RBC QN AUTO: 27.6 PG (ref 26–34)
MCHC RBC AUTO-ENTMCNC: 32.6 G/DL (ref 28–37)
MCV RBC: 84.6 FL (ref 80–100)
MPV: 8.2 FL. (ref 7.2–11.1)
PCO2 BLD: 29.4 MMHG (ref 35–45)
PCO2 BLD: 34.4 MMHG (ref 35–45)
PLATELET COUNT*: 225 THOU/UL (ref 150–400)
PO2 BLD: 168.4 MMHG (ref 75–100)
PO2 BLD: 34.1 MMHG (ref 75–100)
POTASSIUM SERPL-SCNC: 4.3 MMOL/L (ref 3.5–5.1)
PROT SERPL-MCNC: 6.7 G/DL (ref 6.4–8.2)
PROT UR QL STRIP: (no result)
RBC # BLD AUTO: 3.4 MIL/UL (ref 4.2–5)
RBC # UR STRIP: (no result) /UL
RBC #/AREA URNS HPF: (no result) /HPF (ref 0–2)
RDW-CV: 18.1 % (ref 10.5–14.5)
SODIUM SERPL-SCNC: 131 MMOL/L (ref 136–145)
SP GR UR STRIP: >= 1.03 (ref 1–1.03)
SQUAMOUS: (no result) /LPF (ref 0–3)
URINE CLARITY: CLEAR
URINE GLUCOSE-RANDOM: NEGATIVE
URINE LEUKOCYTES-REFLEX: NEGATIVE
URINE NITRITE-REFLEX: NEGATIVE
URINE WBC-REFLEX: (no result) /HPF (ref 0–5)
UROBILINOGEN UR STRIP-ACNC: 0.2 E.U./DL (ref 0.2–1)
WBC # BLD AUTO: 7.8 THOU/UL (ref 4–11)

## 2018-10-28 NOTE — EKG
[place] [address]
Phone:  [phone]                     ELECTROCARDIOGRAM REPORT      
_______________________________________________________________________________
 
Name:       KRISTIN ROMO                  Room:           [address]    [place]M [place]  
M.R.#:  [id number]      Account #:      [id number]  
Admission:  10/28/18     Attend Phys:    Matt Guadarrama, 
Discharge:               Date of Birth:  [birth year]  
         Report #: [id number]
    [id number]
_______________________________________________________________________________
THIS REPORT FOR:  //name//                      
 
                         [facility] ED
                                       
Test Date:    2018-10-27               Test Time:    22:04:30
Pat Name:     KRISTIN ROMO              Department:   
Patient ID:   [id number]            Room:         [facility]
Gender:       F                        Technician:   DEVAN
:          [id number]               Requested By: Laura Castanon
Order Number: [id number] MD:   Benitez Gauthier
                                 Measurements
Intervals                              Axis          
Rate:         91                       P:            56
PA:           203                      QRS:          -13
QRSD:         86                       T:            166
QT:           406                                    
QTc:          500                                    
                           Interpretive Statements
Sinus rhythm
Low voltage, extremity leads
Repol abnrm suggests ischemia, lateral leads
Compared to ECG 2018 11:36:44
Low QRS voltage now present
Possible ischemia now present
 
Electronically Signed On 10- 12:00:21 CDT by Benitez Gauthier
[email or web address]
 
 
 
 
 
 
 
 
 
 
 
 
 
 
 
 
  <ELECTRONICALLY SIGNED>
                                           By: Benitez Gauthier MD, FACC   
  10/28/18     1200
D: 10/27/18 2204   _____________________________________
T: 10/27/18 2204   Benitez Gauthier MD, FACC     /EPI

## 2018-10-28 NOTE — EKG
[place] [address]
Phone:  [phone]                     ELECTROCARDIOGRAM REPORT      
_______________________________________________________________________________
 
Name:       KRISTIN ROMO                  Room:           [address]    [place]M [place]  
M.R.#:  [id number]      Account #:      [id number]  
Admission:  10/28/18     Attend Phys:    Matt Guadarrama, 
Discharge:               Date of Birth:  [birth year]  
         Report #: [id number]
    [id number]
_______________________________________________________________________________
THIS REPORT FOR:  //name//                      
 
                         [facility] ED
                                       
Test Date:    2018-10-28               Test Time:    03:17:46
Pat Name:     KRISTIN ROMO              Department:   
Patient ID:   [id number]            Room:         [facility]
Gender:       F                        Technician:   
:          [id number]               Requested By: Laura Castanon
Order Number: [id number] MD:   Benitez Gauthier
                                 Measurements
Intervals                              Axis          
Rate:         84                       P:            51
TX:           196                      QRS:          -34
QRSD:         95                       T:            122
QT:           470                                    
QTc:          556                                    
                           Interpretive Statements
Sinus rhythm
Left axis deviation
Borderline low voltage, extremity leads
Abnormal R-wave progression, late transition
Borderline repolarization abnormality
Prolonged QT interval
Compared to ECG 2018 11:36:44
Prolonged QT interval now present
 
Electronically Signed On 10- 12:01:25 CDT by Benitez Gauthier
[email or web address]
 
 
 
 
 
 
 
 
 
 
 
 
 
 
  <ELECTRONICALLY SIGNED>
                                           By: Benitez Gauthier MD, FACC   
  10/28/18     1201
D: 10/28/18 0317   _____________________________________
T: 10/28/18 0317   Benitez Gauthier MD, FAC     /EPI

## 2018-10-28 NOTE — NUR
ASSUMED PT CARE AT 0700 PT C/O PAIN IN LEFT LEG GAVE PT PAIN MEDS PT STATES
PAIN MEDS HELP, PT DENIES SOA ON 15L/NON-REBREATHER PT REEDUCATED TO KEEP MASK
ON, PT IS BEDREST PT IS TURNED Q2 HOURS, PT IS SR 1ST ON MONITOR, SPOKE WITH
ORTHO WHO STATES NO SURGERY TODAY NEEDS CARDIOLOGY CLEARANCE AND MAY PROCEED
WITH SURGERY TOMORROW, PT IS HARD OF HEARING ORTHO CLEARED PT TO EAT TODAY PT
WILL BE NPO AT MIDNIGHT EDUCATED PT ON PLAN OF CARE PT STATES UNDERSTANDING,
WILL CONTINUE TO MONITOR

## 2018-10-29 VITALS — DIASTOLIC BLOOD PRESSURE: 66 MMHG | SYSTOLIC BLOOD PRESSURE: 150 MMHG

## 2018-10-29 VITALS — SYSTOLIC BLOOD PRESSURE: 158 MMHG | DIASTOLIC BLOOD PRESSURE: 66 MMHG

## 2018-10-29 VITALS — DIASTOLIC BLOOD PRESSURE: 61 MMHG | SYSTOLIC BLOOD PRESSURE: 124 MMHG

## 2018-10-29 VITALS — SYSTOLIC BLOOD PRESSURE: 136 MMHG | DIASTOLIC BLOOD PRESSURE: 52 MMHG

## 2018-10-29 VITALS — DIASTOLIC BLOOD PRESSURE: 62 MMHG | SYSTOLIC BLOOD PRESSURE: 133 MMHG

## 2018-10-29 LAB
ALBUMIN SERPL-MCNC: 3.2 G/DL (ref 3.4–5)
ALP SERPL-CCNC: 74 U/L (ref 46–116)
ALT SERPL-CCNC: 17 U/L (ref 30–65)
ANION GAP SERPL CALC-SCNC: 11 MMOL/L (ref 7–16)
AST SERPL-CCNC: 24 U/L (ref 15–37)
BILIRUB SERPL-MCNC: 0.6 MG/DL
BUN SERPL-MCNC: 28 MG/DL (ref 7–18)
CALCIUM SERPL-MCNC: 8.8 MG/DL (ref 8.5–10.1)
CHLORIDE SERPL-SCNC: 101 MMOL/L (ref 98–107)
CO2 SERPL-SCNC: 23 MMOL/L (ref 21–32)
CREAT SERPL-MCNC: 1.9 MG/DL (ref 0.6–1.3)
GLUCOSE SERPL-MCNC: 85 MG/DL (ref 70–99)
HCT VFR BLD CALC: 26.4 % (ref 37–47)
HGB BLD-MCNC: 8.6 GM/DL (ref 12–15)
MAGNESIUM SERPL-MCNC: 1.8 MG/DL (ref 1.8–2.4)
MCH RBC QN AUTO: 27.7 PG (ref 26–34)
MCHC RBC AUTO-ENTMCNC: 32.5 G/DL (ref 28–37)
MCV RBC: 85.3 FL (ref 80–100)
MPV: 8.2 FL. (ref 7.2–11.1)
PLATELET COUNT*: 180 THOU/UL (ref 150–400)
POTASSIUM SERPL-SCNC: 4 MMOL/L (ref 3.5–5.1)
PROT SERPL-MCNC: 6.1 G/DL (ref 6.4–8.2)
RBC # BLD AUTO: 3.1 MIL/UL (ref 4.2–5)
RDW-CV: 17.7 % (ref 10.5–14.5)
SODIUM SERPL-SCNC: 135 MMOL/L (ref 136–145)
WBC # BLD AUTO: 7 THOU/UL (ref 4–11)

## 2018-10-29 PROCEDURE — 0QS704Z REPOSITION LEFT UPPER FEMUR WITH INTERNAL FIXATION DEVICE, OPEN APPROACH: ICD-10-PCS | Performed by: ORTHOPAEDIC SURGERY

## 2018-10-29 NOTE — NUR
END SHIFT: PT RESTED WELL. PAIN CONTROLLED WITH PAIN MEDICATION, IV AND ORAL.
HAS REMAINED NPO SINCE [place] AWAITING DECISION FOR SURGERY. NSR/SB ON MONITOR.
TITRATED OFF NRB TO NC AND TOLERATING WELL. LUNGS REMAIN WET AND COURSE
SOUNDING. ICE PACK TO LEFT HIP FOR COMFORT. NICE DRAINING ADEQUATE. SAFETY
PRECAUTIONS IN PLACE. VSS. ASSESSMENT UNCHANGED. CALL LIGHT IN REACH.
PERFORMED HOURLY ROUNDING. WILL CONT TO MONITOR.

## 2018-10-29 NOTE — CON
[address]  [address]                    CONSULTATION                  
_______________________________________________________________________________
 
Name:       KRISTIN ROMO                  Room:           [address] [place]  
.R.#:  [id number]      Account #:      [id number]  
Admission:  10/28/18     Attend Phys:    Matt Guadarrama, 
Discharge:               Date of Birth:  [birth year]  
         Report #: [id number]
                                                                     [id number]  
_______________________________________________________________________________
THIS REPORT FOR:  //name//                      
 
CC: Nevin Guadarrama
 
DATE OF SERVICE:  10/28/2018
 
 
INPATIENT CONSULTATION
 
REASON FOR CONSULTATION:  Preop hip surgery, congestive heart failure.
 
HISTORY OF PRESENT ILLNESS:  The patient is an 86-year-old female with a history
of diastolic heart failure who presents with a fall and fracture of her hip.  We
were asked to see her for preop evaluation.  She had a mildly displaced
intertrochanteric fracture of the left femoral neck.  She has a significant
oxygen requirement and presents with bilateral rales.  She denies chest pain or
pressure, but admits she had been short of breath increasingly over the last
several days.
 
She denies palpitations.  She presents in sinus rhythm.
 
She has a known history of preserved LV systolic function, but mild valvular
disease, mild aortic valve stenosis.
 
PAST MEDICAL HISTORY:  She has a history of chronic diastolic heart failure,
followed by Dr. Bernal.  She also has remote history of atherosclerotic heart
disease; records are not available.  She has chronic kidney disease, remote
history of GI bleeding, frequent urinary tract infections.
 
ALLERGIES:  SHE HAS ALLERGIES TO LATEX, PENICILLIN.
 
MEDICATIONS:  She takes iron, Protonix 40 mg daily, baby aspirin, Toprol 25 mg
p.o. b.i.d.
 
REVIEW OF SYSTEMS:
GASTROINTESTINAL:  No nausea or vomiting.
HEMATOLOGIC:  No anemia or bleeding disorders.
GENITOURINARY:  No dysuria or hematuria.
MUSCULOSKELETAL:  Positive fall.
SKIN:  No rashes.
GENERAL:  No fevers or chills.
CARDIOVASCULAR:  No chest pain.  Positive dyspnea, positive orthopnea, positive
PND.  No palpitations.
RENAL:  No history of kidney failure.  Chronic kidney disease as noted.
 
 
 
 
[place] [address]                    CONSULTATION                  
_______________________________________________________________________________
 
Name:       KRISTIN ROMO                  Room:           [address]#:  [id number]      Account #:      [id number]  
Admission:  10/28/18     Attend Phys:    Matt Guadarrama, 
Discharge:               Date of Birth:  [birth year]  
         Report #: [id number]
                                                                     [id number]  
_______________________________________________________________________________
PHYSICAL EXAMINATION:
VITAL SIGNS:  Blood pressure is 128/64, pulse is 80.  She is in a sinus rhythm. 
Respiratory rate is 22.  Her O2 sats are 100% on oxygen via facemask delivery.
GENERAL:  This is a cachectic, elderly female.
HEENT:  There is no evidence of trauma.  Eyes, EOMs are intact.  Ears, she is
hard of hearing.
NECK:  Supple.  No jugular venous distention.
CARDIOVASCULAR:  Regular.  Systolic murmur.
LUNGS:  Coarse breath sounds bilaterally.
ABDOMEN:  Soft, nontender.
EXTREMITIES:  No peripheral edema.  There is peripheral wasting.
 
DIAGNOSTIC DATA:  X-ray shows congestive heart failure.  V/Q scan was low
probability for PE.
 
LABORATORY DATA:  Hemoglobin is 9.4, white blood cell count is 7.8, platelet
count is 225,000.  Sodium is 131, potassium is 4.3, chloride is 99, CO2 is 20,
BUN is 25, creatinine is 1.7.  Troponin I was 2.27, 1.33 on presentation.  It is
noted on 09/02/2018 her troponin I was normal and then on 09/03/2018 it was
5.56.
 
IMPRESSION:
1.  Acute congestive heart failure, diastolic.  She has significant oxygen
requirements and infiltrates on x-ray.  She has been given a low dose of IV
Lasix, I would redose this again today.  We will continue with high-flow oxygen.
2.  Non-ST elevation myocardial infarction.  At this point in time, she reports
no angina, but has a history of coronary artery disease and not currently a
candidate for aggressive coronary intervention.
3.  Hip fracture.  She is a high risk for this type of surgery.  We would need
to stabilize her oxygen function, she presents with active congestive heart
failure exacerbation and likely had a myocardial ischemic event recently.  I
will check a limited echocardiogram to assess her LV function.
4.  Coronary artery disease.  As noted above.
5.  Acute kidney injury.  We will need to monitor renal function closely.
 
 
 
 
 
 
 
 
 
 
<ELECTRONICALLY SIGNED>
                                        By:  Gerald Bryant MD, FACC   
10/29/18     0911
D: 10/28/18 1152_______________________________________
T: 10/28/18 2233Benitez Gauthier MD, FACC      /nt

## 2018-10-29 NOTE — 2DMMODE
[place] [address]
Phone:  [phone] 2 D/M-MODE ECHOCARDIOGRAM     
_______________________________________________________________________________
 
Name:         KRISTIN ROMO                 Room:          [address] [place] 
[facility]#:    [id number]     Account #:     [id number]  
Admission:    10/28/18    Attend Phys:   Matt Hammond
Discharge:                Date of Birth: [birth year]  
Date of Service: 10/29/18 1320  Report #:      [id number]
        [id number]
_______________________________________________________________________________
THIS REPORT FOR:  //name//                      
 
 
--------------- APPROVED REPORT --------------
 
 
Study performed:  10/29/2018 10:00:33
 
EXAM: Comprehensive 2D, Doppler, and color-flow 
Echocardiogram 
Patient Location: In-Patient   
Room #:  [facility]     Status:  routine
 
      BSA:         1.54
HR: 74 bpm BP:          150/66 mmHg 
Rhythm: NSR     
 
Other Information 
Study Quality: Good
 
Indications
Dyspnea 
 
2D Dimensions
IVSd:  8.88 (7-11mm) LVOT Diam:  18.57 (18-24mm) 
LVDd:  41.13 mm  
PWd:  7.99 (7-11mm) Ascending Ao:  27.77 (22-36mm)
LVDs:  29.64 (25-40mm) 
Aortic Root:  25.13 mm 
 
Volumes
Left Atrial Volume (Systole) 
    LA ESV Index:  44.00 mL/m2
 
Aortic Valve
AoV Peak Sin.:  1.68 m/s 
AO Peak Gr.:  11.26 mmHg LVOT Max P.12 mmHg
AO Mean Gr.:  5.92 mmHg  LVOT Mean P.12 mmHg
    LVOT Max V:  0.73 m/s
AO V2 VTI:  33.09 cm  LVOT Mean V:  0.49 m/s
ABBI (VTI):  1.31 cm2  LVOT V1 VTI:  15.99 cm
 
Mitral Valve
    E/A Ratio:  1.51
    MV Decel. Time:  171.97 ms
MV E Max Sin.:  1.14 m/s 
 
 
[place] [address]
Phone:  [phone]                     2 D/M-MODE ECHOCARDIOGRAM     
_______________________________________________________________________________
 
Name:         KRISTIN ROMO E                 Room:          [address] [place] 
.R.#:    [id number]     Account #:     [id number]  
Admission:    10/28/18    Attend Phys:   Matt Hammond
Discharge:                Date of Birth: [birth year]  
Date of Service: 10/29/18 1320  Report #:      [id number]
        [id number]
_______________________________________________________________________________
MV PHT:  49.87 ms  
MVA (PHT):  4.41 cm2  
 
TDI
E/Lateral E':  14.25 E/Medial E':  28.50
   Medial E' Sin.:  0.04 m/s
   Lateral E' Sin.:  0.08 m/s
 
Pulmonary Valve
PV Peak Sin.:  0.92 m/s PV Peak Gr.:  3.36 mmHg
 
Tricuspid Valve
    RAP Estimate:  5.00 mmHg
TR Peak Gr.:  39.16 mmHg RVSP:  44.00 mmHg
    PA Pressure:  44.00 mmHg
 
Left Ventricle
The left ventricle is normal size. There is normal LV segmental wall 
motion. There is normal left ventricular wall thickness. Left 
ventricular systolic function is mildly decreased. LVEF is 45%. The 
left ventricular diastolic function is normal.
 
Right Ventricle
Right ventricle is mildly dilated.
 
Atria
Left atrium is moderately dilated. Right atrium is mildly 
dilated.
 
Aortic Valve
Moderate aortic valve sclerosis. Trace aortic regurgitation. Mild 
aortic stenosis.
 
Mitral Valve
The mitral valve is normal in structure. Mild to moderate mitral 
regurgitation. No evidence of mitral valve stenosis.
 
Tricuspid Valve
The tricuspid valve is normal in structure. Mild tricuspid 
regurgitation. Moderate pulmonary hypertension.
 
Pulmonic Valve
The pulmonary valve is normal in structure. Mild pulmonic 
regurgitation.
 
[facility] [employer]
 
 
[place] [address]
Phone:  [phone]                     2 D/M-MODE ECHOCARDIOGRAM     
_______________________________________________________________________________
 
Name:         KRISTIN ROMO                 Room:          [address] [place] 
[facility].#:    [id number]     Account #:     [id number]  
Admission:    10/28/18    Attend Phys:   Matt Hammond
Discharge:                Date of Birth: [birth year]  
Date of Service: 10/29/18 1320  Report #:      [id number]
        [id number]
_______________________________________________________________________________
The aortic root is normal in size. IVC is normal in size and 
collapses &gt;50% with inspiration.
 
Pericardium
There is no pericardial effusion.
 
&lt;Conclusion&gt;
The left ventricle is normal size.
There is normal left ventricular wall thickness.
Left ventricular systolic function is mildly decreased.
LVEF is 45%.
The left ventricular diastolic function is normal.
Right ventricle is mildly dilated.
Left atrium is moderately dilated.
Right atrium is mildly dilated.
Moderate aortic valve sclerosis.
Trace aortic regurgitation.
Mild aortic stenosis.
The mitral valve is normal in structure.
Mild to moderate mitral regurgitation.
No evidence of mitral valve stenosis.
The tricuspid valve is normal in structure.
IVC is normal in size and collapses &gt;50% with inspiration.
There is no pericardial effusion.
There is normal LV segmental wall motion.
 
 
 
 
 
 
 
 
 
 
 
 
 
 
 
 
 
 
 
  <ELECTRONICALLY SIGNED>
                                           By: Christopher Telles MD, FACC     
  10/29/18     132
D: 10/29/18 1320   _____________________________________
T: 10/29/18 1320   Christopher Telles MD, FACC       /INF

## 2018-10-29 NOTE — NUR
MET WITH PT AND SR COLLETTE BALDWIN IN ROOM TO DISCUSS HOME SITUATION/DC
PLANNING. PT KNOWN TO CM FROM PREVIOUS STAY IN HOSPITAL AND REHAB STAY IN
SEPT. 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
SHE HAS HH WITH [facility] AND USES WALKER AND W/C AS NEEDED.  HAD FALL AND
NOW WITH HIP FX, TO HAVE SURGERY SOON.  DISCUSSED POSSIBLE DC NEEDS REHAB VS
SNF.  PER SR COLLETTE, SHE IS DPOA AS WELL AS SR ELLIE CARROLL.  SR COLLETTE
ALSO STATED THAT '[facility]' HELPS MAKE DECISIONS RE: LIVING SITUATION
ALSO.  WILL FOLLOW AND ASSIST

## 2018-10-30 VITALS — SYSTOLIC BLOOD PRESSURE: 113 MMHG | DIASTOLIC BLOOD PRESSURE: 55 MMHG

## 2018-10-30 VITALS — SYSTOLIC BLOOD PRESSURE: 139 MMHG | DIASTOLIC BLOOD PRESSURE: 61 MMHG

## 2018-10-30 VITALS — SYSTOLIC BLOOD PRESSURE: 116 MMHG | DIASTOLIC BLOOD PRESSURE: 55 MMHG

## 2018-10-30 VITALS — SYSTOLIC BLOOD PRESSURE: 115 MMHG | DIASTOLIC BLOOD PRESSURE: 55 MMHG

## 2018-10-30 VITALS — DIASTOLIC BLOOD PRESSURE: 57 MMHG | SYSTOLIC BLOOD PRESSURE: 137 MMHG

## 2018-10-30 VITALS — DIASTOLIC BLOOD PRESSURE: 42 MMHG | SYSTOLIC BLOOD PRESSURE: 94 MMHG

## 2018-10-30 NOTE — CON
[address]  [address]                    CONSULTATION                  
_______________________________________________________________________________
 
Name:       JAYNA ROMO                  Room:           [address] [place]  
.R.#:  [id number]      Account #:      [id number]  
Admission:  10/28/18     Attend Phys:    Matt Guadarrama, 
Discharge:               Date of Birth:  [birth year]  
         Report #: [id number]
                                                                     [id number]  
_______________________________________________________________________________
THIS REPORT FOR:  //name//                      
 
CC: Nevin Guadarrama
 
DATE OF SERVICE:  10/29/2018
 
 
REQUESTING PHYSICIAN:  Matt Guadarrama MD
 
REASON FOR CONSULTATION:  Hypoxic respiratory failure.
 
DISCUSSION:  The patient is an 86-year-old nonsmoking woman with no prior
history of documented lung disease.  She resides in a [facility] center.  She
was brought to the Emergency Department actually on the evening of 10/27/2018
following a fall.  She had been in either some type of a chair or on the bed and
slipped to the floor.  She did not have a syncopal episode.  Was having
significant pain.  EMS was called and she was brought to the hospital.  When
evaluated in the Emergency Department, she was found to have a fractured left
hip.  There was difficulty keeping up her O2 saturations in the ER.  At one
point she was on a nonrebreather.  She has been weaned to a nasal cannula at the
time I saw her this morning.
 
She is a lifelong nonsmoker.  She has no prior history of known pulmonary
disease.  She does have a history of coronary artery disease and also diastolic
heart failure.  She is known to have some mild aortic stenosis.  X-rays
including a CT scan done revealed cardiomegaly with pulmonary vascular
congestion.  A noncontrast CT chest revealed bilateral pleural effusions as well
as vascular congestion.  Previously, she had had some mild mediastinal
adenopathy and that remained stable.  Because of her high O2 needs and surgery
being contemplated, we were asked to see her.
 
Cardiology has been consulted as well.  They did see her yesterday.  Note that
her troponins were elevated yesterday.  Her proBNP was also greater than 3500.
 
She does note she has been more short of breath recently.  However, denies
having any cough or congestion at home.  She is not aware of any fevers at home.
 She has had several other falls.  One of them may have also been associated
with a syncopal episode.  This morning, she is denying any cough, palpitations
or chest pain.  Main complaint is severe pain in her left hip and leg area.  For
obvious reasons, she is not wanting to be moved.
 
PAST MEDICAL HISTORY:  Primarily remarkable for coronary artery disease.  She
has had coronary artery bypass grafting surgery done at [facility]. 
She has also had stents placed.  Again, all this has been at [facility].  She typically follows with Dr. Bernal.  She has had a prior
 
 
 
[place] [address]                    CONSULTATION                  
_______________________________________________________________________________
 
Name:       JAYNA ROMO                  Room:           [address] [place]  
..#:  [id number]      Account #:      [id number]  
Admission:  10/28/18     Attend Phys:    Matt Guadarrama, 
Discharge:               Date of Birth:  [birth year]  
         Report #: [id number]
                                                                     [id number]  
_______________________________________________________________________________
appendectomy, hypertension, diverticular disease and she had a prior
laparoscopic cholecystectomy, prior episodes of SIADH.  She has remote history
of breast cancer and had bilateral mastectomy greater than 10 years ago.
 
MEDICATIONS:  At the time of admission, iron supplements, Protonix, aspirin,
Lexapro, vitamin D, metoprolol.
 
SOCIAL HISTORY:  Nonsmoker.  She is a [employer].  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
had done some adult care.  She currently is residing in a [facility] facility. 
Did have TB skin tests done in the past, which were all negative.
 
FAMILY HISTORY:  Not pertinent in this elderly woman.
 
REVIEW OF SYSTEMS:  A 12-point ROS was done.  Note positives above.  It appears
this fall was a mechanical fall and she denies any syncope or loss of
consciousness prior to this event.  However, she does admit to prior episodes of
syncope.  Has had some trouble getting around.  Denies any difficulty
swallowing.  She has not had any nausea or vomiting.  Has no chest pain.  Has
some occasional cough, no sputum.  She is not aware of any fevers.  It is
otherwise negative.
 
PHYSICAL EXAMINATION:
GENERAL APPEARANCE:  A woman who looks her stated age.  She is alert,
conversant, able to speak in full sentences.
HEENT:  She is quite hard of hearing.  Head is normocephalic.  She does have
some ecchymotic areas noted.  Has O2 running via nasal cannula.
NECK:  Negative for cervical adenopathy.  No supraclavicular adenopathy.  Neck
veins may be a little well developed.
HEART:  Regular with occasional extrasystole.  Tones are distant.
LUNGS:  She has a few crackles heard in the bases.  No wheezing is heard.  Exam
is somewhat limited due to difficulty moving her.
ABDOMEN:  Soft, without appreciable hepatosplenomegaly.  No guarding.  Does have
some discomfort in her left lower quadrant and over the left leg area.
EXTREMITIES:  Left leg was not moved or disturbed.  Right extremity reveals few
small varicosities.  No significant edema.
SKIN:  Warm and dry.
NEUROLOGIC:  She is alert and oriented x 3.
 
LABORATORY AND X-RAY FINDINGS:  Chest x-ray and her CAT scan were reviewed.  Her
CT chest was done yesterday.  She has bilateral pleural effusions noted.  A
hiatal hernia is also noted.  Mild mediastinal adenopathy is noted.  Arterial
blood gases done yesterday on a nonrebreather, she had a pH of 7.41, a pCO2 of
29, a pO2 of 168, bicarbonate of 18 with a saturation of 98%.  Her BUN is 28,
creatinine of 1.9, potassium is 4.0.  Her sodium is 135, up from 130 yesterday. 
ProBNP was greater than 35,000.  Troponins peaked at 2.27 yesterday, down to 1.2
today.  Echocardiogram has been done, [place] [address]                    CONSULTATION                  
_______________________________________________________________________________
 
Name:       JAYNA ROMO                  Room:           [address] [place]  
..#:  [id number]      Account #:      [id number]  
Admission:  10/28/18     Attend Phys:    Matt Guadarrama, 
Discharge:               Date of Birth:  [birth year]  
         Report #: [id number]
                                                                     [id number]  
_______________________________________________________________________________
cell count 7000, hemoglobin is 8.6, hematocrit of 26.4, platelets 180,000. 
Echocardiogram done in early September revealed normal systolic function, 50-55%
EF.  Did suggest she had impaired LV relaxation.  LA was moderately dilated. 
She had mild aortic stenosis and mild aortic regurgitation.  Mild mitral
regurgitation.  Did have moderate pulmonary hypertension.
 
IMPRESSION:
1.  Hypoxic respiratory failure.  O2 needs have already improved.  She has gone
from a nonrebreather down to nasal cannula at the time I saw her this morning. 
Clinically, I suspect this is probably pulmonary edema.  Has bilateral pleural
effusions.  She had been getting progressively more short of breath even prior
to her fall.  May have had unrecognized myocardial infarction (non-ST elevation
myocardial infarction).  Clinically, doubt pneumonia.
2.  Status post fall with left hip fracture.
3.  Remote history of breast carcinoma.
4.  Chronic kidney disease.
5.  Hyponatremia, has been thought to have syndrome of inappropriate
antidiuretic hormone secretion in the past.
6.  History of hypertension.
7.  Mild anemia.
 
RECOMMENDATIONS:
1.  Wean O2 as able.
2.  Echo is pending.  Cardiology is also following up.  I think there okay for
surgery is probably the most imperative.
3.  Once she gets through her surgery, depending on followup imaging, could
consider thoracentesis.  Most likely this is related to her fluid status, but
with remote history of breast cancer, recurrence does need to be kept into the
differential diagnosis.
 
 
 
 
 
 
 
 
 
 
 
 
 
 
 
<ELECTRONICALLY SIGNED>
                                        By:  Jayna Jamison MD             
10/30/18     0849
D: 10/29/18 1322_______________________________________
T: 10/29/18 2226Jayna Jamison MD                /nt

## 2018-10-30 NOTE — NUR
MET WITH PT'S DPOA/SR WALTON AND SR COLLETTE TO DISCUSS DC PLANS AGAIN.  PT
CONFUSED TODAY.  SR WALTON STATED THAT PT WILL NEED SNF, THAT SHE DOESN'T KNOW
YET BUT MAY NOT BE ABLE TO RETURN TO HER ILF.  THE 'COMMUNITY' IS CONSIDERING
OPTIONS FOR HER LIVING SITUATION/  DISCUSSED SNF OPTIONS, ANTICIPATE PT WILL
BE READY BY END OF WEEK FOR DC.  THEY WOULD LIKE [facility], IF NOT,
THEN [facility] OR [place].  CALLED AND FAXED REFERRAL TO
[facility], [facility] CAME TO VISIT WITH PT AND THEY WILL ACCEPT PT PENDING BED
AVAILABILITY.  STRESSED THAT IT'S IMPORTANT TO PT PER SR WALTON TO BE WHERE
SHE CAN RECEIVE COMMUNION AND MASS.  WILL FOLLOW

## 2018-10-30 NOTE — NUR
PATINET RESTING IN BED. UP WITH ASSIST X1 AND WALKER AND 50 PERCENT WEIGHT
BEARING TO LEFT HIP. VITAL SIGNS STABLE AND PATINET IN NOAPPARENT DISTRESS AT
THIS TIME. HOURLY ROUNDING COMPLETED FOR PATINET SAFETY

## 2018-10-31 VITALS — DIASTOLIC BLOOD PRESSURE: 72 MMHG | SYSTOLIC BLOOD PRESSURE: 151 MMHG

## 2018-10-31 VITALS — DIASTOLIC BLOOD PRESSURE: 65 MMHG | SYSTOLIC BLOOD PRESSURE: 143 MMHG

## 2018-10-31 VITALS — SYSTOLIC BLOOD PRESSURE: 133 MMHG | DIASTOLIC BLOOD PRESSURE: 64 MMHG

## 2018-10-31 VITALS — SYSTOLIC BLOOD PRESSURE: 131 MMHG | DIASTOLIC BLOOD PRESSURE: 62 MMHG

## 2018-10-31 VITALS — SYSTOLIC BLOOD PRESSURE: 141 MMHG | DIASTOLIC BLOOD PRESSURE: 63 MMHG

## 2018-10-31 VITALS — SYSTOLIC BLOOD PRESSURE: 123 MMHG | DIASTOLIC BLOOD PRESSURE: 49 MMHG

## 2018-10-31 LAB
ANION GAP SERPL CALC-SCNC: 10 MMOL/L (ref 7–16)
BILIRUB UR-MCNC: NEGATIVE MG/DL
BUN SERPL-MCNC: 39 MG/DL (ref 7–18)
CALCIUM SERPL-MCNC: 8.7 MG/DL (ref 8.5–10.1)
CHLORIDE SERPL-SCNC: 99 MMOL/L (ref 98–107)
CO2 SERPL-SCNC: 23 MMOL/L (ref 21–32)
COLOR UR: YELLOW
CREAT SERPL-MCNC: 2.1 MG/DL (ref 0.6–1.3)
GLUCOSE SERPL-MCNC: 122 MG/DL (ref 70–99)
KETONES UR STRIP-MCNC: NEGATIVE MG/DL
NITRITE UR QL STRIP: NEGATIVE
POTASSIUM SERPL-SCNC: 4.1 MMOL/L (ref 3.5–5.1)
PROT UR QL STRIP: NEGATIVE
RBC # UR STRIP: NEGATIVE /UL
SODIUM SERPL-SCNC: 132 MMOL/L (ref 136–145)
SP GR UR STRIP: 1.02 (ref 1–1.03)
URINE CLARITY: CLEAR
URINE GLUCOSE-RANDOM: NEGATIVE
URINE LEUKOCYTES: NEGATIVE
UROBILINOGEN UR STRIP-ACNC: 0.2 E.U./DL (ref 0.2–1)

## 2018-10-31 NOTE — NUR
AT 0300, PT BLADDER SCANNED, RESIDUAL >900 AFTER SMALL EPISODE OF URINARY
INCONTINENCE AND INABILITY TO VOID. NEW ORDER RECEIVED BY DR. MCKEON.
SILICONE NICE CATHETER INSERTED.

## 2018-10-31 NOTE — NUR
re: CHF medication education. Met with pt to discuss heart failure medication.
Discussion focused on metoprolol.  Pt expressed she has limitations to
understanding due to poor hearing & delegating medication coordination to her
sister. We did review importance of compliance with regimen and discussed
potential side effects. Left medication information for pt (& her sister) with
contanct information for the pharmacy for any further questions or issues.

## 2018-10-31 NOTE — NUR
PT UP IN CHAIR SEVERAL TIMES THIS SHIFT. CONTINUES TO C/O LEFT HIP PAIN AND
MEDICATED PER EMAR. WILL CONTINUE TO MONITOR

## 2018-10-31 NOTE — NUR
ASSUMED CARE OF PT AT 1930. VSS. CARDIAC MONITOR IN PLACE. SR. [place] DRESSING
CLEAN, DRY AND INTACT. Q 2 H REPOSITIONING COMPLETED. SILICONE NICE INSERTED
FOR URINARY RETENTION. HOURLY ROUNDING COMPLETED. FALL PRECAUTIONS IN PLACE.
CALL LIGHT WITHIN REACH.

## 2018-10-31 NOTE — NUR
PT ALERT, ORIENTED AND ALL VSS ON 2L. TELE TRACKING NSR 80'S-90'S. C/O LEFT
HIP/LEG PAIN- MEDEICATED PER EMAR. NICE TO DD WITH ADEQUATE UO SO FAR THIS
SHIFT. UP IN CHAIR THIS AM AND TOLERATING WELL.  EDUCATED ON SAFETY AND PLAN
OF CARE. PLEASE SEE ASSESSMENT FOR ADDITIONAL INFORMATION. WILL CONTINUE TO
MONITOR.

## 2018-11-01 VITALS — SYSTOLIC BLOOD PRESSURE: 136 MMHG | DIASTOLIC BLOOD PRESSURE: 35 MMHG

## 2018-11-01 VITALS — DIASTOLIC BLOOD PRESSURE: 62 MMHG | SYSTOLIC BLOOD PRESSURE: 137 MMHG

## 2018-11-01 VITALS — SYSTOLIC BLOOD PRESSURE: 134 MMHG | DIASTOLIC BLOOD PRESSURE: 45 MMHG

## 2018-11-01 VITALS — DIASTOLIC BLOOD PRESSURE: 52 MMHG | SYSTOLIC BLOOD PRESSURE: 136 MMHG

## 2018-11-01 VITALS — DIASTOLIC BLOOD PRESSURE: 60 MMHG | SYSTOLIC BLOOD PRESSURE: 151 MMHG

## 2018-11-01 VITALS — SYSTOLIC BLOOD PRESSURE: 125 MMHG | DIASTOLIC BLOOD PRESSURE: 66 MMHG

## 2018-11-01 LAB
ANION GAP SERPL CALC-SCNC: 8 MMOL/L (ref 7–16)
BUN SERPL-MCNC: 40 MG/DL (ref 7–18)
CALCIUM SERPL-MCNC: 8.3 MG/DL (ref 8.5–10.1)
CHLORIDE SERPL-SCNC: 100 MMOL/L (ref 98–107)
CHOLEST SERPL-MCNC: 89 MG/DL (ref ?–200)
CO2 SERPL-SCNC: 24 MMOL/L (ref 21–32)
CREAT SERPL-MCNC: 1.9 MG/DL (ref 0.6–1.3)
GLUCOSE SERPL-MCNC: 93 MG/DL (ref 70–99)
HDLC SERPL-MCNC: 32 MG/DL (ref 40–?)
LDLC SERPL-MCNC: 37 MG/DL (ref ?–100)
POTASSIUM SERPL-SCNC: 3.7 MMOL/L (ref 3.5–5.1)
SERUM ASSESSMENT: CLEAR
SODIUM SERPL-SCNC: 132 MMOL/L (ref 136–145)
TC:HDL: 2.8 RATIO
TRIGL SERPL-MCNC: 100 MG/DL (ref ?–150)
VLDLC SERPL CALC-MCNC: 20 MG/DL (ref ?–40)

## 2018-11-01 NOTE — NUR
PT STATES FEELING BETTER TODAY AND LOOKING FORWARD TO REHAB AND DC TOMORROW.
ASSESSMENT REMAINS UNCHANGED. WILL CONTINUE TO MONITOR

## 2018-11-01 NOTE — NUR
CONTINUE TO FOLLOW, DISCUSSED WITH DR MATUTE.  PT TO GO TO SNF AT [facility], THEY WILL HAVE A BED FOR HER TOMORROW/FRI. UPDATED PT AND DPOA/SR WALTON. IN AGREEMENT WITH PLAN.  PT HAD REHAB CONSULT ALSO, DPOA PREFERS SNF,
VOICED CONCERN THAT PT WILL NOT RETURN HOME AT OR.  SPOKE WITH TRAM/REHAB
LIASON, WILL NOT PURSUE REHAB EVAL

## 2018-11-01 NOTE — NUR
PT DROWSY, BUT DOES READILY RESPOND TO VOICE. ORIENTED X2-3. VSS ON 2L. PT C/O
LEFT HIP PAIN- EDUCATED PER EMAR. NICE TO DD WITH ADEQUATE UO SO FAR THIS
SHIFT. EDUCATED ON SAFETY AND PLAN OF CARE. PLEASE SEE ASSESSMENT FOR
ADDITIONAL INFORMATION. WILL CONTINUE TO MONITOR

## 2018-11-01 NOTE — NUR
ASSUMED PT CARE AT 1930. ASSESSMENT COMPLETED AS CHARTED. ABLE TO MAKE MY
NEEDS KNOWN. C/O LEFT HIP PAIN FROM RECENT HIP SURGERY. Q2TURN. RESTED MOST OF
THE SHIFT. WILL CONTINUE TO MONITOR.

## 2018-11-02 VITALS — SYSTOLIC BLOOD PRESSURE: 123 MMHG | DIASTOLIC BLOOD PRESSURE: 53 MMHG

## 2018-11-02 VITALS — DIASTOLIC BLOOD PRESSURE: 52 MMHG | SYSTOLIC BLOOD PRESSURE: 128 MMHG

## 2018-11-02 VITALS — DIASTOLIC BLOOD PRESSURE: 55 MMHG | SYSTOLIC BLOOD PRESSURE: 136 MMHG

## 2018-11-02 VITALS — SYSTOLIC BLOOD PRESSURE: 147 MMHG | DIASTOLIC BLOOD PRESSURE: 69 MMHG

## 2018-11-02 LAB
ANION GAP SERPL CALC-SCNC: 8 MMOL/L (ref 7–16)
BUN SERPL-MCNC: 35 MG/DL (ref 7–18)
CALCIUM SERPL-MCNC: 8.2 MG/DL (ref 8.5–10.1)
CHLORIDE SERPL-SCNC: 101 MMOL/L (ref 98–107)
CO2 SERPL-SCNC: 27 MMOL/L (ref 21–32)
CREAT SERPL-MCNC: 1.7 MG/DL (ref 0.6–1.3)
GLUCOSE SERPL-MCNC: 89 MG/DL (ref 70–99)
POTASSIUM SERPL-SCNC: 3.5 MMOL/L (ref 3.5–5.1)
SODIUM SERPL-SCNC: 136 MMOL/L (ref 136–145)

## 2018-11-02 NOTE — NUR
CARDIAC MONITOR IN PLACE AND RUNNING NSR. ON 2L O2 VIA NC. AX0 TO SELF AND
LOCATION. UP WITH X2 ASSIST. HOURLY ROUNDING COMPLETED FOR PT SAFETY. CALL
LIGHT IN REACH AND FALL PRECAUTIONS IN PLACE. WILL CONTINUE TO MONITOR.

## 2018-11-02 NOTE — NUR
DISCHARGE PLANNER SPOKE TO LISA WITH [facility] TO INFORM OF THE PATIENT'S D/C.
LISA INFORMS THAT [facility] WILL PROVIDE TRANSPORTATION TO THE FACILTIY AT 1630.
D/C PLANNER SPOKE TO THE PATIENT AND SR WALTON TO DISCUSS DISCHARGE PLANNING
NEEDS AND SNF AT [facility] WITH TRANSFER AT 1630.  PATIENT AND SR WALTON IN AGREEMENT.  DISCHARGE PLANNER INFORMED THE RN IN-CHARGE OF THE
PATIENT OF THE PATIENT'S TIME OF TRANSFER AND WHERE TO CALL REPORT. RN IN
AGREEMENT. CM WILL REMAIN AVAILABLE TO ASSIST AND FOLLOW AS NEEDED.

## 2018-11-02 NOTE — NUR
0723 ASSUMED CARE OF PATIENT. PLEASE SEE DOCUMENTED ASSESSMENT. PT IS ORIENTED
TO SELF ONLY. PLAN IS FOR DISCHARGE TO [facility] TODAY. LEFT  LEG
DRESSING DRY AND INTACT

## 2018-11-02 NOTE — NUR
UP TO [facility] TO VOID WITHOUT SUCCESS. BACK TO BED. MEDICATED FOR PAIN

## 2018-11-02 NOTE — NUR
ACUTE INPATIENT REHAB UNIT CONSULT RECEIVED 11/1/18 AND ACKNOWLEDGED BY REHAB
LIAISON AND DR. SCOTT.  PER , KHOA, PATIENT IS TO DISCHARGE TO A
SKILLED NURSING FACILITY.  PATIENT IS S/P RECENT IRF STAY AND IS CURRENTLY 50%
WEIGHT BEARING S/P HIP FX.  PER CASE MANAGEMENT NOTES, PATIENT MAY NOT BE ABLE
TO RETURN TO HER INDEPENDENT LIVING FACILITY.  THANK YOU FOR THIS REFERRAL.

## 2018-11-07 ENCOUNTER — HOSPITAL ENCOUNTER (OUTPATIENT)
Dept: HOSPITAL 96 - M.INFUS | Age: 83
End: 2018-11-07
Attending: GENERAL PRACTICE
Payer: MEDICARE

## 2018-11-07 VITALS — DIASTOLIC BLOOD PRESSURE: 50 MMHG | SYSTOLIC BLOOD PRESSURE: 129 MMHG

## 2018-11-07 VITALS — SYSTOLIC BLOOD PRESSURE: 130 MMHG | DIASTOLIC BLOOD PRESSURE: 70 MMHG

## 2018-11-07 DIAGNOSIS — D64.9: Primary | ICD-10-CM

## 2018-11-07 NOTE — H
[address]  [address]                    HISTORY AND PHYSICAL          
_______________________________________________________________________________
 
Name:       KRISTIN ROMO                  Room:           [address] [place]  
M.R.#:  [id number]      Account #:      [id number]  
Admission:  09/19/18     Attend Phys:    Dimple Romero DO   
Discharge:  09/28/18     Date of Birth:  [birth year]  
         Report #: [id number]
                                                                     [id number]  
_______________________________________________________________________________
THIS REPORT FOR:  //name//                      
 
CC: Nevin Romero
 
Please note this was an interrupted stay.
 
The previous medical record that this current stay should be linked to is
[id number].
 
The patient was taken to acute for 24-hour period to be monitored closely for
hyponatremia.  She was then brought back to acute inpatient rehabilitation to
finish her stay.
 
Please refer to the plan of care on medical record #[id number] as needed.
 
 
 
 
 
 
 
 
 
 
 
 
 
 
 
 
 
 
 
 
 
 
 
 
 
 
 
 
 
<ELECTRONICALLY SIGNED>
                                        By:  Dimple Romero DO            
11/07/18     1356
D: 09/20/18 1345_______________________________________
T: 09/20/18 1356Dimple Romero DO               /nt

## 2018-11-07 NOTE — NUR
ARRIVED PER WHEELCHAIR. TRANSFERED SELF TO RECLINER. BLOOD TRANSFUSION
COMPLETED AND TOLERATED WELL. ESCORTED TO FRONT FOR RIDE BACK TO [facility]

## 2018-11-11 ENCOUNTER — HOSPITAL ENCOUNTER (INPATIENT)
Dept: HOSPITAL 96 - M.ERS | Age: 83
LOS: 4 days | Discharge: NURSING, REHAB OR LONG-TERM CARE | DRG: 871 | End: 2018-11-15
Attending: FAMILY MEDICINE | Admitting: FAMILY MEDICINE
Payer: MEDICARE

## 2018-11-11 VITALS — SYSTOLIC BLOOD PRESSURE: 100 MMHG | DIASTOLIC BLOOD PRESSURE: 46 MMHG

## 2018-11-11 VITALS — DIASTOLIC BLOOD PRESSURE: 67 MMHG | SYSTOLIC BLOOD PRESSURE: 151 MMHG

## 2018-11-11 VITALS — DIASTOLIC BLOOD PRESSURE: 46 MMHG | SYSTOLIC BLOOD PRESSURE: 102 MMHG

## 2018-11-11 VITALS — BODY MASS INDEX: 22.38 KG/M2 | WEIGHT: 111 LBS | HEIGHT: 59.02 IN

## 2018-11-11 VITALS — SYSTOLIC BLOOD PRESSURE: 122 MMHG | DIASTOLIC BLOOD PRESSURE: 40 MMHG

## 2018-11-11 VITALS — DIASTOLIC BLOOD PRESSURE: 47 MMHG | SYSTOLIC BLOOD PRESSURE: 110 MMHG

## 2018-11-11 VITALS — DIASTOLIC BLOOD PRESSURE: 51 MMHG | SYSTOLIC BLOOD PRESSURE: 105 MMHG

## 2018-11-11 DIAGNOSIS — F03.90: ICD-10-CM

## 2018-11-11 DIAGNOSIS — Z79.899: ICD-10-CM

## 2018-11-11 DIAGNOSIS — Z90.13: ICD-10-CM

## 2018-11-11 DIAGNOSIS — A41.9: Primary | ICD-10-CM

## 2018-11-11 DIAGNOSIS — Z91.040: ICD-10-CM

## 2018-11-11 DIAGNOSIS — D50.9: ICD-10-CM

## 2018-11-11 DIAGNOSIS — I50.33: ICD-10-CM

## 2018-11-11 DIAGNOSIS — K83.8: ICD-10-CM

## 2018-11-11 DIAGNOSIS — Z88.2: ICD-10-CM

## 2018-11-11 DIAGNOSIS — Z90.49: ICD-10-CM

## 2018-11-11 DIAGNOSIS — Z80.0: ICD-10-CM

## 2018-11-11 DIAGNOSIS — N18.4: ICD-10-CM

## 2018-11-11 DIAGNOSIS — Z87.81: ICD-10-CM

## 2018-11-11 DIAGNOSIS — J15.6: ICD-10-CM

## 2018-11-11 DIAGNOSIS — Z95.1: ICD-10-CM

## 2018-11-11 DIAGNOSIS — Z87.01: ICD-10-CM

## 2018-11-11 DIAGNOSIS — Z88.0: ICD-10-CM

## 2018-11-11 DIAGNOSIS — I25.10: ICD-10-CM

## 2018-11-11 DIAGNOSIS — Z85.3: ICD-10-CM

## 2018-11-11 DIAGNOSIS — D63.8: ICD-10-CM

## 2018-11-11 DIAGNOSIS — I13.0: ICD-10-CM

## 2018-11-11 DIAGNOSIS — K44.9: ICD-10-CM

## 2018-11-11 DIAGNOSIS — Z79.82: ICD-10-CM

## 2018-11-11 LAB
%HYPO/RBC NFR BLD AUTO: (no result) %
ABSOLUTE EOSINOPHILS: 0.2 THOU/UL (ref 0–0.7)
ABSOLUTE MONOCYTES: 0.3 THOU/UL (ref 0–1.2)
ALBUMIN SERPL-MCNC: 3.1 G/DL (ref 3.4–5)
ALP SERPL-CCNC: 389 U/L (ref 46–116)
ALT SERPL-CCNC: 34 U/L (ref 30–65)
ANION GAP SERPL CALC-SCNC: 12 MMOL/L (ref 7–16)
ANISOCYTOSIS BLD QL SMEAR: (no result)
AST SERPL-CCNC: 71 U/L (ref 15–37)
BILIRUB SERPL-MCNC: 2.4 MG/DL
BILIRUB UR-MCNC: (no result) MG/DL
BUN SERPL-MCNC: 26 MG/DL (ref 7–18)
CALCIUM SERPL-MCNC: 8.9 MG/DL (ref 8.5–10.1)
CHLORIDE SERPL-SCNC: 98 MMOL/L (ref 98–107)
CO2 SERPL-SCNC: 22 MMOL/L (ref 21–32)
COLOR UR: (no result)
CREAT SERPL-MCNC: 1.5 MG/DL (ref 0.6–1.3)
EOSINOPHIL NFR BLD: 1 %
GLUCOSE SERPL-MCNC: 138 MG/DL (ref 70–99)
GRANULOCYTES NFR BLD MANUAL: 65 %
HCT VFR BLD CALC: 39.3 % (ref 37–47)
HGB BLD-MCNC: 12.4 GM/DL (ref 12–15)
INR PPP: 1.1
KETONES UR STRIP-MCNC: (no result) MG/DL
LIPASE: 94 U/L (ref 73–393)
LYMPHOCYTES # BLD: 0.2 THOU/UL (ref 0.8–5.3)
LYMPHOCYTES NFR BLD AUTO: 1 %
MCH RBC QN AUTO: 27.3 PG (ref 26–34)
MCHC RBC AUTO-ENTMCNC: 31.6 G/DL (ref 28–37)
MCV RBC: 86.6 FL (ref 80–100)
MONOCYTES NFR BLD: 2 %
MPV: 8 FL. (ref 7.2–11.1)
NEUTROPHILS # BLD: 16.6 THOU/UL (ref 1.6–8.1)
NEUTS BAND NFR BLD: 31 %
NUCLEATED RBCS: 0 /100WBC
PLATELET # BLD EST: ADEQUATE 10*3/UL
PLATELET COUNT*: 283 THOU/UL (ref 150–400)
POLYCHROMASIA BLD QL SMEAR: (no result)
POTASSIUM SERPL-SCNC: 4.2 MMOL/L (ref 3.5–5.1)
PROT SERPL-MCNC: 6.8 G/DL (ref 6.4–8.2)
PROT UR QL STRIP: (no result)
PROTHROMBIN TIME: 11.7 SECONDS (ref 9.2–11.5)
RBC # BLD AUTO: 4.54 MIL/UL (ref 4.2–5)
RBC # UR STRIP: NEGATIVE /UL
RDW-CV: 18.4 % (ref 10.5–14.5)
SODIUM SERPL-SCNC: 132 MMOL/L (ref 136–145)
SP GR UR STRIP: 1.02 (ref 1–1.03)
TROPONIN-I LEVEL: <0.06 NG/ML (ref ?–0.06)
URINE CLARITY: CLEAR
URINE GLUCOSE-RANDOM: NEGATIVE
URINE LEUKOCYTES-REFLEX: NEGATIVE
URINE NITRITE-REFLEX: NEGATIVE
UROBILINOGEN UR STRIP-ACNC: 1 E.U./DL (ref 0.2–1)
WBC # BLD AUTO: 17.3 THOU/UL (ref 4–11)

## 2018-11-11 NOTE — NUR
RECEIVED REPORT AND ASSUMED CARE OF PT, ASSESSMENT COMPLETED. PT VERY [place] BUT
ABLE TO COMMUNICATED LOUD AND CLOSE TO PT. ASSISTED TO BSC, PT HAVING MOD AMT
OF BROWN LIQ STOOL. COCCYX RED. LT HIP DRSG DRY AND INTACT. O2 ON AT 2L/NC, NO
SOB NOTED. WILL CONT TO MONITOR AND ASSIST AS NEEDED.

## 2018-11-11 NOTE — PROC
[address]  [address]                    PROCEDURE REPORT              
_______________________________________________________________________________
 
Name:       KRISTIN ROMO            Room:           [address] [place]  
.R.#:  [id number]      Account #:      [id number]  
Admission:  11/11/18     Attend Phys:    Matt Guadarrama, 
Discharge:  11/15/18     Date of Birth:  [birth year]  
         Report #: [id number]
                                                                     [id number]  
_______________________________________________________________________________
THIS REPORT FOR:  //name//                      
 
CC: Casimiro Benitez MD
 
DATE OF SERVICE:  11/11/2018
 
 
PROCEDURE PERFORMED:  Esophagogastroduodenoscopy; ERCP with biliary
sphincterotomy, followed by balloon dilation of papilla, followed by bile duct
sweep of common bile duct.
 
SEDATION USED:  General endotracheal anesthesia.  The patient received
antibiotics in the form of Ancef prior to procedure.
 
INDICATIONS:  The patient is a pleasant 86-year-old white female who was
admitted to hospital with rather severe abdominal pain associated with jaundice
and suspected stones or sphincter of Oddi dysfunction.  She was seen in
consultation earlier today and scheduled for today's examination.  See
consultation for further details.
 
PHYSICAL EXAMINATION:
GENERAL:  Pleasantly demented 86-year-old white female who is awake and alert.
CARDIOPULMONARY:  Revealed a regular rate and rhythm.
LUNGS:  Clear.
ABDOMEN:  Soft.  She was tender in the epigastric area.  No rebound or guarding
noted.
 
LABORATORY TESTS:  Revealed a white count of 17.3, hemoglobin 12.4, platelet
count of 288,000 and bilirubin 2.4, alkaline phosphatase 389.  Her AST 71, ALT
34.
 
DESCRIPTION OF PROCEDURE:  After informed consent, once the patient was
adequately sedated and placed in the semi-prone position and intubated, the
Olympus video upper scope was advanced under direct vision into the esophagus
which appeared completely normal.  There were no esophagitis, rings, webs or
strictures.  Stomach was entered and examined in its entirety and revealed
healed erosions and Neel erosions from the hiatal hernia sac.  She has a
large hiatal hernia sac.  The antrum, body appeared normal as did the cardia and
fundus with exception of hiatal hernia.  Pylorus widely patent revealing a
normal duodenal bulb and distal duodenum.  Scope was withdrawn and exchanged for
a video duodenoscope.
 
 
 
 
[place] [address]                    PROCEDURE REPORT              
_______________________________________________________________________________
 
Name:       KRISTIN ROMOANOR            Room:           [address] [place]  
[facility].#:  [id number]      Account #:      [id number]  
Admission:  11/11/18     Attend Phys:    Matt Guadarrama, 
Discharge:  11/15/18     Date of Birth:  [birth year]  
         Report #: [id number]
                                                                     [id number]  
_______________________________________________________________________________
The Olympus video duodenoscope was advanced under direct vision into the
esophagus and the stomach, the second portion of duodenum where the ampullary
orifice was noted.  The common bile duct, the duodenal segment was markedly
dilated.  A Hydratome catheter with guidewire was then advanced with the wire
directly into the common bile duct after which the catheter was then advanced
and opacification of biliary radicles was performed.  The patient's bile duct
was markedly dilated up to 14 mm.  I do not see any obvious stones or debris
within the common bile duct, but the duct was markedly dilated.  For this
reason, biliary sphincterotomy was performed in the 12 o'clock position followed
by biliary balloon dilation of the papilla with a 10 mm x 4 mm biliary balloon
dilator.  Once this was achieved, bile duct was then swept of any debris with a
12/15 mm extraction balloon.  No debris was noted from the same.  This could
represent either that a stone had passed causing transient biliary obstruction
or that she had sphincter of Oddi type 1 causing the same process.  In the
event, the biliary sphincterotomy, biliary balloon dilation to sweep the common
bile duct should take care of the issues related to her cholestatic jaundice. 
The patient was then extubated and sent to recovery room in stable condition.
 
IMPRESSION:
1.  Healed Neel erosion within the large hiatal hernia sac.
2.  Otherwise, normal endoscopy.
3.  Markedly dilated biliary tree of 14 mm with poor emptying from the same.
4.  Successful biliary sphincterotomy followed by biliary balloon dilation of
papilla.
5.  Successful bile duct sweep of the common bile duct of any debris within the
same.
6.  Pancreatic duct not entered.
 
RECOMMENDATIONS:
1.  We will allow the patient go ahead and eat a heart healthy diet at this
point in time.
2.  If she is doing well, she can possibly go home tomorrow or next day.
3.  I spoke with one of the patient's durable power of  during the
procedure.  We will have her follow up with us if she gets out of the hospital
as well.
 
 
 
 
 
 
 
 
 
                       
                                        By:                                
                 
D: 11/24/18 1526_______________________________________
T: 11/25/18 Twan Salvador DO             /nt

## 2018-11-11 NOTE — EKG
[place] [address]
Phone:  [phone]                     ELECTROCARDIOGRAM REPORT      
_______________________________________________________________________________
 
Name:       KRISTIN ROMO            Room:           [address]    [place]M [place]  
M.R.#:  [id number]      Account #:      [id number]  
Admission:  18     Attend Phys:    Matt Guadarrama, 
Discharge:               Date of Birth:  [birth year]  
         Report #: [id number]
    [id number]
_______________________________________________________________________________
THIS REPORT FOR:  //name//                      
 
                         [facility] ED
                                       
Test Date:    2018               Test Time:    03:16:55
Pat Name:     KRISTIN ROMO              Department:   
Patient ID:   [id number]            Room:         [facility]
Gender:       F                        Technician:   
:          [id number]               Requested By: Laura Castanon
Order Number: [id number] MD:   Gerald Bryant
                                 Measurements
Intervals                              Axis          
Rate:         93                       P:            41
AL:           98                       QRS:          -7
QRSD:         89                       T:            241
QT:           477                                    
QTc:          594                                    
                           Interpretive Statements
Sinus rhythm
Short AL interval
T wave inversion anterior leads consider ischemia
Lateral leads are also involved
Prolonged QT interval
Compared to ECG 10/28/2018 03:17:46
Short AL interval now present
Left-axis deviation no longer present
 
Electronically Signed On 2018 10:25:31 CST by Gerald Bryant
[email or web address]
 
 
 
 
 
 
 
 
 
 
 
 
 
 
  <ELECTRONICALLY SIGNED>
                                           By: Gerald Bryant MD, FACC   
  18     1025
D: 18 0316   _____________________________________
T: 18   Gerald Bryant MD, FAC     /EPI

## 2018-11-11 NOTE — NUR
RECEIVED REPORT FROM MAIK AND ASSUMED CARE OF PT AT 0730. PT IS ALERT AND
ORIENTED X 4 WITH CONFUSION AT TIMES. VSS. MED SURG STATUS. ASSESSMENT AS
CHARTED. PT ON O2 2L/NC. SPO2 99%.IV RIGHT AC PATENT WITH IV FLUIDS INFUSING
PER ORDERS. PT IS CALM AND COOPERATIVE WITH NO COMPLAINTS OF PAIN. ABDOMEN
ULTRASOUND COMPLETED. NPO STATUS ADVANCED TO CLEAR LIQUIDS. PT TO BE NPO AT
MIDNIGHT FOR MRCP IN AM. MRI QUESTIONAIRE COMPLETED AND ON CHART. EGD/ERCP
PENDING MRCP RESULTS. CONSENT ON CHART BUT NOT SIGNED. LEFT HIP DRESSING
CHANGED. SCD'S ON. PICTURE TAKEN OF COCCYX. PT IS UP WITH ONE ASSIST TO BSC.
PT INFORMED OF PLAN OF CARE AND COMMUNICATES UNDERSTANDING. HOURLY ROUNDING
COMPLETED FOR PT SAFETY. CALL LIGHT AND FALL PRECAUTIONS IN PLACE. WILL
CONTINUE TO MONITOR FOR DURATION OF SHIFT.

## 2018-11-12 VITALS — SYSTOLIC BLOOD PRESSURE: 136 MMHG | DIASTOLIC BLOOD PRESSURE: 60 MMHG

## 2018-11-12 VITALS — SYSTOLIC BLOOD PRESSURE: 130 MMHG | DIASTOLIC BLOOD PRESSURE: 56 MMHG

## 2018-11-12 VITALS — DIASTOLIC BLOOD PRESSURE: 78 MMHG | SYSTOLIC BLOOD PRESSURE: 147 MMHG

## 2018-11-12 VITALS — SYSTOLIC BLOOD PRESSURE: 121 MMHG | DIASTOLIC BLOOD PRESSURE: 62 MMHG

## 2018-11-12 VITALS — DIASTOLIC BLOOD PRESSURE: 62 MMHG | SYSTOLIC BLOOD PRESSURE: 121 MMHG

## 2018-11-12 LAB
%HYPO/RBC NFR BLD AUTO: (no result) %
ABSOLUTE EOSINOPHILS: 0.1 THOU/UL (ref 0–0.7)
ABSOLUTE MONOCYTES: 0.7 THOU/UL (ref 0–1.2)
ALBUMIN SERPL-MCNC: 2.4 G/DL (ref 3.4–5)
ALP SERPL-CCNC: 216 U/L (ref 46–116)
ALT SERPL-CCNC: 21 U/L (ref 30–65)
ANION GAP SERPL CALC-SCNC: 7 MMOL/L (ref 7–16)
ANISOCYTOSIS BLD QL SMEAR: (no result)
AST SERPL-CCNC: 22 U/L (ref 15–37)
BILIRUB DIRECT SERPL-MCNC: 0.3 MG/DL
BILIRUB SERPL-MCNC: 0.6 MG/DL
BUN SERPL-MCNC: 34 MG/DL (ref 7–18)
CALCIUM SERPL-MCNC: 8.3 MG/DL (ref 8.5–10.1)
CHLORIDE SERPL-SCNC: 104 MMOL/L (ref 98–107)
CO2 SERPL-SCNC: 26 MMOL/L (ref 21–32)
CREAT SERPL-MCNC: 1.6 MG/DL (ref 0.6–1.3)
EOSINOPHIL NFR BLD: 3 %
GLUCOSE SERPL-MCNC: 80 MG/DL (ref 70–99)
GRANULOCYTES NFR BLD MANUAL: 76 %
HCT VFR BLD CALC: 27.5 % (ref 37–47)
HGB BLD-MCNC: 9 GM/DL (ref 12–15)
IRON SERPL-MCNC: 11 UG/DL (ref 50–175)
LYMPHOCYTES # BLD: 0.2 THOU/UL (ref 0.8–5.3)
LYMPHOCYTES NFR BLD AUTO: 4 %
MCH RBC QN AUTO: 28.3 PG (ref 26–34)
MCHC RBC AUTO-ENTMCNC: 32.6 G/DL (ref 28–37)
MCV RBC: 86.6 FL (ref 80–100)
MICROCYTES: (no result)
MONOCYTES NFR BLD: 16 %
MPV: 8.5 FL. (ref 7.2–11.1)
NEUTROPHILS # BLD: 3.4 THOU/UL (ref 1.6–8.1)
NEUTS BAND NFR BLD: 1 %
NUCLEATED RBCS: 0 /100WBC
PLATELET # BLD EST: ADEQUATE 10*3/UL
PLATELET COUNT*: 181 THOU/UL (ref 150–400)
POTASSIUM SERPL-SCNC: 3.1 MMOL/L (ref 3.5–5.1)
PROT SERPL-MCNC: 5 G/DL (ref 6.4–8.2)
RBC # BLD AUTO: 3.17 MIL/UL (ref 4.2–5)
RDW-CV: 18.5 % (ref 10.5–14.5)
SAO2 % BLD FROM PO2: 5 % (ref 20–39)
SODIUM SERPL-SCNC: 137 MMOL/L (ref 136–145)
WBC # BLD AUTO: 4.4 THOU/UL (ref 4–11)

## 2018-11-12 PROCEDURE — 0F798ZZ DILATION OF COMMON BILE DUCT, VIA NATURAL OR ARTIFICIAL OPENING ENDOSCOPIC: ICD-10-PCS | Performed by: INTERNAL MEDICINE

## 2018-11-12 NOTE — NUR
PT. TURNED AND REPOISITIONED EVERY 2HRS AFTER ARRIVAL HOURLY ROUNDING
COMPLETED.  SITTING UP IN BED AND FEEDING SELF AFTER BRIEF NAP.  TOOK MEDS
WITH WATER WITHOUT DIFFICULTY. PT. ASSISTS WITH TURNS AND STAYS O L SIDE.

## 2018-11-12 NOTE — NUR
PATIENT NPO THIS AM FOR EGD/ERCP. PATIENT RETURNED TO ROOM AROUND 1500.
VITALS STABLE AS CHARTED. IV SL, SCHED ABX INFUSING. POTASSIUM 3.1, REPLACING
PER PROTOCOL PO. PATIENT HEART HEALTHY DIET. PATIENT TO TRANSFER DOWN TO ROOM
103. REPORT CALLED TO JOCELIN VERNON. M/S STATUS.

## 2018-11-12 NOTE — NUR
Nutrition: Pt assessed for pressure ulcer on coccyx. Currently NPO for
EGD/ERCP. Pt likes Ensure. Will not order at this time d/t NPO status. H/o
CAD, HTN, diverticulitis. Liquid BM today. Was eating ~50% of meals before
NPO. K+ 3.4, alb 2.4. Usual wt is ~120#, today is recorded as 111#.
RD will follow for diet advancement, po intake, BM, need for supplement, wt.
Follow up 11/14/18.

## 2018-11-12 NOTE — NUR
AWAKE FREQ DURING NIGHT WITH C/O LT HIP PAIN. PO MEDS GIVEN X2 AND EFFECTIVE.
NPO AFTER MN FOR EGD, ERCP TODAY. ASSISTED TO BSC, HAVING LIQ STOOL AND INCONT
FROM URGENCY. HS GOALS OF REST AND SAFETY PARTICALLY ACHIEVED. HOURLY ROUNDING
OBSERVED.

## 2018-11-12 NOTE — NUR
PT. ARRIVED AT 1600 PER BED ALERT ORIENTED BUT FORGETFUL [place] WEARS HEARING
AIDS.  HX OF VOMITING ABDOMINAL PAIN.  RECENT L HIP REPLACEMENT  DRESSING D/I
50% WB L LE PER FAMILY REPORT.  DATE WAS OCT 28TH.  MUCH BRUISING L SIDE BACK
SMALL RED SLIT ABOVE COCCYX NOTED MOIDTURE BARRIER APPLIED. TURNED TO L SIDE
TO PREVENT PRESSURE.  EGD AND ERCP PROCEDURE TODAY BEFORE TRANSFER TO ROOM
103 FROM 222.  IV INFUSING PER RT. ANTECUBITAL PER PUMP.

## 2018-11-13 VITALS — DIASTOLIC BLOOD PRESSURE: 59 MMHG | SYSTOLIC BLOOD PRESSURE: 130 MMHG

## 2018-11-13 VITALS — SYSTOLIC BLOOD PRESSURE: 139 MMHG | DIASTOLIC BLOOD PRESSURE: 68 MMHG

## 2018-11-13 VITALS — SYSTOLIC BLOOD PRESSURE: 148 MMHG | DIASTOLIC BLOOD PRESSURE: 69 MMHG

## 2018-11-13 LAB
ALBUMIN SERPL-MCNC: 2.3 G/DL (ref 3.4–5)
ALP SERPL-CCNC: 226 U/L (ref 46–116)
ALT SERPL-CCNC: 18 U/L (ref 30–65)
ANION GAP SERPL CALC-SCNC: 12 MMOL/L (ref 7–16)
AST SERPL-CCNC: 16 U/L (ref 15–37)
BILIRUB SERPL-MCNC: 0.5 MG/DL
BUN SERPL-MCNC: 39 MG/DL (ref 7–18)
CALCIUM SERPL-MCNC: 8.5 MG/DL (ref 8.5–10.1)
CHLORIDE SERPL-SCNC: 104 MMOL/L (ref 98–107)
CO2 SERPL-SCNC: 22 MMOL/L (ref 21–32)
CREAT SERPL-MCNC: 1.8 MG/DL (ref 0.6–1.3)
GLUCOSE SERPL-MCNC: 82 MG/DL (ref 70–99)
HCT VFR BLD CALC: 27.3 % (ref 37–47)
HGB BLD-MCNC: 8.8 GM/DL (ref 12–15)
MAGNESIUM SERPL-MCNC: 1.9 MG/DL (ref 1.8–2.4)
MCH RBC QN AUTO: 27.9 PG (ref 26–34)
MCHC RBC AUTO-ENTMCNC: 32.3 G/DL (ref 28–37)
MCV RBC: 86.4 FL (ref 80–100)
MPV: 8.8 FL. (ref 7.2–11.1)
PLATELET COUNT*: 196 THOU/UL (ref 150–400)
POTASSIUM SERPL-SCNC: 4.5 MMOL/L (ref 3.5–5.1)
PROT SERPL-MCNC: 5.2 G/DL (ref 6.4–8.2)
RBC # BLD AUTO: 3.16 MIL/UL (ref 4.2–5)
RDW-CV: 19 % (ref 10.5–14.5)
SODIUM SERPL-SCNC: 138 MMOL/L (ref 136–145)
WBC # BLD AUTO: 4.5 THOU/UL (ref 4–11)

## 2018-11-13 NOTE — NUR
PT.UP IN CHAIR. SISTER FROM [place] HERE VISITING. PT.WANTS TO GO BACK TO
[facility], AT DISCHARGE,TO A SKILLED BED. SHE HAD BEEN THERE ABOUT 9
DAYS BEFORE BEING SENT BACK WITH NAUSEA AND VOMITING. NORMALLY SHE LIVES IN [place]
[address]. SHE HAS A WALKER. MESSAGE LEFT FOR LISA/[facility]
TO DISCUSS PT'S RETURN TOMORROW.

## 2018-11-13 NOTE — NUR
PT ROLANDO RESTING IN BED, FRIEND AT BEDN SIDE- IV TO RIGHT AC NOTED TO HAVE
FALLEN OUT THIS SHIFT- POOR PO INTAKE NOTED THIS SHIFT WITH MEALS- PT UP TO
BED SIDE CHAIR MOST SHIFT, TOLERATING WELL- C/O PAIN X1 THIS SHIFT TO LEGS-
PRN HYDROCODONE GIVEN AT 1230 THIS SHIFT- PT REPORTS MEDICATIONS TO BE
EFFECTIVE- CALL LIGHT AND PERSONAL BELONGINGS WITH IN REACH- HOURLY ROUNDS IN
PLACE R/T SAFETY/NEEDS- ALL NEEDS MET AT THIS TIME-WCTM

## 2018-11-13 NOTE — NUR
PATIENT HAS SLEPT WELL THROUGHOUT MOST OF THE NIGHT. VSS ON RA. PAIN
MEDICATION GIVEN AND CHARTED. PATIENT UP WITH ASSIST X 1 TO BSC. PATIENT
TURNED EVERY 2HRS AND PRN. MOISES CARE PERFORMED. IV IN RIGHT AC-SL. FALL
PRECAUTIONS IN PLACE AND HOURLY ROUNDS MADE. WILL CONTINUE WITH PLAN OF CARE
AND NURSING TO MONITOR.

## 2018-11-13 NOTE — NUR
ASSUMED CARE OF PT THIS AM AROUND 0715- UPON ASSESSMENT PT NOTED TO BE RESTING
IN BED- PT A&O X1-2 WITH NOTED CONFUSSION, [place]- CONTINENT VS INCONTINENT OF
BOWEL AND BLADDER- ASSIST X1 TO BED SIDE COMMODE-LCTA, RESP EVEN AND
UN-LABORED- VSS, O2  ON RA- ABDOMEN SOFT/ROUND/NON-TENDER, BS X4 QUADS-
LAST BM REPORTED THIS AM- FAIR PO INTAKE NOTED THIS AM WITH BREAKFAST- CHEST
X-RAY COMPLETED THIS AM WITH RESULTS NOTED IN MEDITECH- IV NOTED TO RIGHT AC
INTACT AND SL, IV ABT GIVEN THIS AM AS PRESCIBED, NO ADVERSE REACTIONS TO
NOTE- PT DENIES ANY C/O PAIN/DISCOMFORT AT THIS TIME- FAMILY AT SIDE
VISITING- HOURLY ROUNDS IN PLACE R/T SAFETY/NEEDS- ALL NEEDS MET AT THIS
TIME-WCTM

## 2018-11-14 VITALS — SYSTOLIC BLOOD PRESSURE: 132 MMHG | DIASTOLIC BLOOD PRESSURE: 62 MMHG

## 2018-11-14 VITALS — SYSTOLIC BLOOD PRESSURE: 147 MMHG | DIASTOLIC BLOOD PRESSURE: 80 MMHG

## 2018-11-14 VITALS — SYSTOLIC BLOOD PRESSURE: 148 MMHG | DIASTOLIC BLOOD PRESSURE: 63 MMHG

## 2018-11-14 LAB
ALBUMIN SERPL-MCNC: 2.8 G/DL (ref 3.4–5)
ALP SERPL-CCNC: 235 U/L (ref 46–116)
ALT SERPL-CCNC: 19 U/L (ref 30–65)
ANION GAP SERPL CALC-SCNC: 10 MMOL/L (ref 7–16)
AST SERPL-CCNC: 15 U/L (ref 15–37)
BILIRUB SERPL-MCNC: 0.5 MG/DL
BUN SERPL-MCNC: 36 MG/DL (ref 7–18)
CALCIUM SERPL-MCNC: 8.8 MG/DL (ref 8.5–10.1)
CHLORIDE SERPL-SCNC: 101 MMOL/L (ref 98–107)
CO2 SERPL-SCNC: 25 MMOL/L (ref 21–32)
CREAT SERPL-MCNC: 1.8 MG/DL (ref 0.6–1.3)
GLUCOSE SERPL-MCNC: 84 MG/DL (ref 70–99)
HCT VFR BLD CALC: 31.2 % (ref 37–47)
HGB BLD-MCNC: 10.1 GM/DL (ref 12–15)
MAGNESIUM SERPL-MCNC: 1.9 MG/DL (ref 1.8–2.4)
MCH RBC QN AUTO: 27.9 PG (ref 26–34)
MCHC RBC AUTO-ENTMCNC: 32.5 G/DL (ref 28–37)
MCV RBC: 85.9 FL (ref 80–100)
MPV: 8.5 FL. (ref 7.2–11.1)
PLATELET COUNT*: 219 THOU/UL (ref 150–400)
POTASSIUM SERPL-SCNC: 3.7 MMOL/L (ref 3.5–5.1)
PROT SERPL-MCNC: 6.1 G/DL (ref 6.4–8.2)
RBC # BLD AUTO: 3.63 MIL/UL (ref 4.2–5)
RDW-CV: 18.3 % (ref 10.5–14.5)
SODIUM SERPL-SCNC: 136 MMOL/L (ref 136–145)
WBC # BLD AUTO: 7.3 THOU/UL (ref 4–11)

## 2018-11-14 NOTE — CON
[address]  [address]                    CONSULTATION                  
_______________________________________________________________________________
 
Name:       KRISTIN ROMO            Room:           [address] [place]  
M.R.#:  [id number]      Account #:      [id number]  
Admission:  11/11/18     Attend Phys:    Matt Guadarrama, 
Discharge:               Date of Birth:  [birth year]  
         Report #: [id number]
                                                                     [id number]  
_______________________________________________________________________________
THIS REPORT FOR:  //name//                      
 
CC: Casimiro Bernal MD [facility]
    Matt Benitez MD
 
DATE OF SERVICE:  11/11/2018
 
 
REFERRING PHYSICIAN:  Matt Guadarrama MD
 
REASON FOR CONSULTATION:  Abdominal pain and elevated liver function test.
 
IMPRESSION:
1.  Epigastric and right upper quadrant pain associated with cholestatic
jaundice - suspect common bile duct stone or stones.
2.  Anemia due to large hiatal hernia with Neel erosions.  This patient is on
chronic Protonix for the same.
3.  Dementia.
4.  History of breast cancer, for which the patient had undergone previous
bilateral mastectomies.
 
RECOMMENDATIONS:
1.  Given the fact that the common bile duct has become massive enlarged and she
had her gallbladder taken earlier this year, I suspect that she does have a
distal common bile duct stone or stones within the same.  In any event, we will
proceed with an MRCP tomorrow morning and repeating her liver function tests as
well.  Depending on results of same, she may need to have an EGD to evaluate for
healing of her Neel erosions, as well as an ERCP with biliary sphincterotomy
and/or stent placement.  I have discussed these plans with the patient, as well
as one of her durable power of attorneys and they are agreeable to this plan as
well.  The patient is also on board though she does not have a great
understanding of the same.
 
HISTORY OF PRESENT ILLNESS:  The patient is a very pleasant 86-year-old white
female well known to me from previous evaluation earlier this year when she was
admitted with acute anemia, associated melena, and hematemesis.  Upper endoscopy
revealed a large hiatal hernia with multiple Neel erosions.  She was placed
on Protonix for the same with plans for her to have her come back for repeat
upper endoscopy in a couple of months.  She was due to see me, but because of
problems with a recent hip fracture, she has not been able to do so.  She is
accompanied by one of the sisters from her order who gives most of the history. 
The patient has been feeling poorly for the last day or two with epigastric pain
associated with nausea, vomiting.  Pain is over in the right side.  She
 
 
 
[place] [address]                    CONSULTATION                  
_______________________________________________________________________________
 
Name:       KRISTIN ROMO            Room:           [address] [place]  
.R.#:  [id number]      Account #:      [id number]  
Admission:  11/11/18     Attend Phys:    Matt Guadarrama, 
Discharge:               Date of Birth:  [birth year]  
         Report #: [id number]
                                                                     [id number]  
_______________________________________________________________________________
underwent laparoscopic cholecystectomy with intraoperative cholangiogram earlier
this year, which suggested there may be a filling defect in distal common bile
duct, but her liver function tests improved, so no ERCP was performed.  She now
presents to the emergency room with these complaints and has an elevated
bilirubin of 2.4 and elevated alkaline phosphatase plus an abdominal ultrasound
and CT scan, which suggested the common bile duct has become massively dilated
up to 14 mm.  She was admitted to hospital for further evaluation and treatment.
 
ALLERGIES:  LATEX, PENICILLIN, and SULFA.
 
MEDICATIONS:  In the MAR.
 
PAST MEDICAL HISTORY:  She has a history of coronary artery disease with
previous open heart surgery in the past, has had bilateral breast cancer, for
which she had surgery for the same, previous appendectomy, hyperlipidemia,
diabetes, chronic reflux, hiatal hernia, broken arm, broken hip, anemia
secondary to GI blood loss.
 
SOCIAL HISTORY:  The patient does not smoke or drink.
 
FAMILY HISTORY:  Negative.
 
PHYSICAL EXAMINATION:
GENERAL:  Pleasant 86-year-old white female who is awake and alert.
CARDIOPULMONARY:  Revealed a regular rate and rhythm.
LUNGS:  Clear.
ABDOMEN:  Soft.  She is tender in the epigastric area.  No rebound or guarding
noted.
 
LABORATORY TESTS:  Revealed a white count of 17.3, hemoglobin 12.4, platelet
count 283,000, MCV is 86.6 and RDW 18.4.  Her total bilirubin is 2.4, alkaline
phosphatase 389.  Her AST is 71, ALT 34, albumin is 3.1.  Her electrolytes are
within normal limits.  BUN and creatinine are 26 and 1.5 respectively for GFR of
33.
 
DISCUSSION:  At the present time, the patient has had problem with recurrent
abdominal pain.  I suspect that she has a common duct stone or stones causing
her biliary obstruction.  We will proceed with MRCP tomorrow and likely EGD and
ERCP tomorrow afternoon.  Discussed the plans with the patient, as well as her
durable power of  who we will get her consent for the same.
 
 
 
 
<ELECTRONICALLY SIGNED>
                                        By:  Abhishek Salvador DO          
11/14/18     1610
D: 11/11/18 1824_______________________________________
T: 11/12/18 0247Abhishek Salvador DO             /nt

## 2018-11-14 NOTE — NUR
ASSESSMENT AS CHARTED. AFEBRILE. VITAL SIGNS STABLE. PT DENIES PAIN. PT WORKED
WITH PT TODAY AND TOLERATED WELL. MEDICATION GIVEN. PT WILL GO TO SKILLED
NURSING TOMORROW.

## 2018-11-14 NOTE — NUR
[facility]ONG[facility] CAME TO VISIT. SHE SAID UNFORTUNATELY THEY WILL NOT
HAVE A ROOM FOR PT.UNTIL TOMORROW.  INFORMED JOCELIN KELLER AND .
PT.NOTIFIED. SHE SEEMED SHORT OF AIR AND SOME CONFUSION WITH HER STATEMENTS.
NOTIFIED JOCELIN NELSON.

## 2018-11-14 NOTE — NUR
Nutrition: follow up note. Diet has advanced to Heart Healthy. Albumin 2.8,
prealb 16.7. Wt still 111#. Had liquid BM. Will order Ensure pudding for added
nutrition today. Mild risk. Follow up per protocol.

## 2018-11-14 NOTE — NUR
ASSESSMEN:
PT REMAIN ALERT AND ORIENT TIMES 2, CONFUSED TO TIME, SITUATTION.  DID NOT
SLEEP WELL DURING THE NIGHT. CONTINUOUSLY CALLED OUT STATING THAT SHE HAD TO
USE THE BATHROOM.  AT THE BEGINNING OF THE SHIFT, PT'S UO = 40. AFTER GETTING
UP TO THE BSC AND TO THE BR ON SEVERAL OCCASSIONS, PT WAS BLADDER SCAN AND SHE
WAS ONLY RETAINING THE MAX OF 84 ML.  A NEW IV WAS INITATED IN LEFT UPPER ARM,
20G.  PT GETS IV ANTIBIOTICS.  ACCORDING TO THE NOTES PT MAY DC TO A SNF
TODAY, PT IS FROM [facility].  LEFT HIP DRESSING C/D/I. BRUISING ON HIP
AND LOWER BACK AREA NOTED.  HYDROCODONE WAS GIVEN PRN WITH PARTIAL RELIEF OF
PAIN PER PT.  AT 0100 PT REQUESTED TO SIT UP IN RECLINER.  PT TOLERATED
SITTING UP FOR 1.5 HRS.  PT IS VERY [place], DOES HAVE AIDS.  VSS, AFEBRILE. SLOW
PROGRESS TOWARDS DC GOALS. WILL CONTINUE TO MONITOR.

## 2018-11-15 VITALS — DIASTOLIC BLOOD PRESSURE: 91 MMHG | SYSTOLIC BLOOD PRESSURE: 168 MMHG

## 2018-11-15 VITALS — SYSTOLIC BLOOD PRESSURE: 168 MMHG | DIASTOLIC BLOOD PRESSURE: 91 MMHG

## 2018-11-15 NOTE — NUR
ASSUMED CARE OF PT AT 0015 FROM MARLENE HAUSER. I AGREE WITH PREVIOUS ASSESSMENT.
FALL PRECAUTIONS IN PLACE. CLWR

## 2018-11-15 NOTE — NUR
LISA/Eleon[facility]RISTIN[facility] CALLED AND STATED THEY CAN TAKE PT.TODAY,IF READY FOR
DISCHARGE. THEY CAN PICK HER UP PER  VAN AT 1400.
WILL FAX DISCHARGE ORDERS/SUMMARY WHEN RECEIVED TO [facility] [phone].

## 2018-11-15 NOTE — NUR
PT ALERT AND ORIENTED 2-3. CONFUSED AT TIMES. DENIES NAUSEA. RECEIVED PO
HYDROCONDONE FOR PAIN @ 1137. UP WITH ASSIST X 1 WITH GAIT BELT AND WALKER.
SIGNIFICANT BRUISING NOTED ON UPPER BACK. PT [place]. IV REMOVED.  [facility] @ 13:25. PRESCRIPTION, DISCHARGE
INSTRUCTIONS, AND COPY OF CHART SENT WITH PT. PT LEFT UNIT @ 14:35 BY
WHEELCHAIR WITH TRANSPORTER TO LEAVE BY WHEELCHAIR VAN.

## 2018-11-15 NOTE — NUR
PT.TO DISCHARGE TODAY BACK TO [facility]. LISA/MANNY SAID THEY CAN PICK
HER UP AT 1400 PER WC VAN. CHART COPIED TO GO WITH PT. NURSING TO CALL REPORT
TO NUMBER PROVIDED.  FAXED DISCHARGE SUMMARY,ORTHO CONSULT ADN EGD PROCEDURE
REPORT TO LISA. NOTIFIED PT.AND FRIEND OF  TIME.

## 2018-11-15 NOTE — NUR
ASSESSMENT:
PT REMAIN ALERT AND ORIENT TIMES TWO, CONFUSED TO TIME AND SITUATION.  NEW
ORDER FOR TAMAZAPAM AND MIRTAZAPINE GIVEN WITH GOOD RESULTS.  REPORT GIVEN TO
MAIK MONREAL AT 0000. PT NOW SLEEPING. VSS, AFEBRILE.

## 2018-11-15 NOTE — OP
[address]  [address]                    OPERATIVE REPORT              
_______________________________________________________________________________
 
Name:       KRISTIN ROMO            Room:           [address] [place]  
M.R.#:  [id number]      Account #:      [id number]  
Admission:  10/28/18     Attend Phys:    Matt Guadarrama, 
Discharge:  11/02/18     Date of Birth:  [birth year]  
         Report #: [id number]
                                                                     [id number]  
_______________________________________________________________________________
THIS REPORT FOR:  //name//                      
 
CC: Nevin Guadarrama
 
DATE OF SERVICE:  10/29/2018
 
 
FAMILY PHYSICIAN:  Dr. Nevin San.
 
PREOPERATIVE DIAGNOSIS:  Closed intertrochanteric fracture, left hip.
 
POSTOPERATIVE DIAGNOSIS:  Closed intertrochanteric fracture, left hip.
 
OPERATION PERFORMED:  Open reduction and internal fixation left hip with
cephalomedullary nail, physician directed fluoroscopy by Dr. Strickland under 1
hour.
 
SURGEON:  Gerald Strickland DO.
 
FIRST ASSISTANT:  Nick Morel DO.
 
ANESTHESIA:  General.
 
GROSS PATHOLOGY:  There was evidence of a closed intertrochanteric fracture of
the left hip.
 
IMPLANTS UTILIZED:  [place] standard gamma nail 125 degree angle.
 
DESCRIPTION OF PROCEDURE:  The patient was brought to the operating room.  Test
dose of Ancef was given without difficulty and 1 gram of Ancef was given IV
piggyback.  The patient was placed on the operating table.  The left leg was
placed in traction.  The fracture was reduced, noted to be in acceptable
position.  Hibiclens scrub and a ChloraPrep, prep were done to the left hip and
leg.  The patient was draped in a sterile manner.  An incision was made
approximately 3 inches in length at the greater trochanteric area.  This was
carried down through the skin and subcuticular material by sharp and blunt
dissection to the greater trochanter.  A starting awl was utilized to make a
hole in the greater trochanter.  The guidewire was placed down the shaft of the
femur and the C-arm was utilized to confirm guidewire in good position.  The
femur was then reamed starting with 9 mm up to 12.5 mm to the area where the connor
will end and the femur was then reamed to 15.5 mm to the area of the lesser
trochanter.  The gamma connor was assembled, placed over the guidewire to the
appropriate depth.  The guidewire was removed.  Utilizing the sleeve, incision
was made down to the bone.  The guidewire was inserted into the femoral neck and
head to the appropriate depth, measurements were taken.  The neck was then
 
 
 
[place] [address]                    OPERATIVE REPORT              
_______________________________________________________________________________
 
Name:       KRISTIN ROMO            Room:           M.228-P    DIS [place]  
M.R.#:  [id number]      Account #:      [id number]  
Admission:  10/28/18     Attend Phys:    Matt Guadarrama, 
Discharge:  11/02/18     Date of Birth:  [birth year]  
         Report #: [id number]
                                                                     [id number]  
_______________________________________________________________________________
reamed with the reamer.  The guidewire was kept in place.  The lag screw was
placed over the guidewire to the appropriate depth.  The proximal set screw was
tightened into the lag screw and backed off a quarter turn.  The guidewire was
removed.  Attention was turned to the distal portion of the connor.  Utilizing the
guide, a small incision was made.  A drill was used to drill across the distal
portion of the connor.  Measurements were taken.  The transverse screw was placed
across the bone and connor.  The assembling device was then removed.  Final
pictures revealed acceptable position of the fracture site and implant devices. 
The deep tissue was closed with 0 Vicryl suture, subcutaneous with 2-0 Vicryl
and staples on the skin.  Estimated blood loss 100 mL.  The wounds were
thoroughly irrigated throughout the entire procedure.  Needle and sponge count
reported as correct.
 
 
 
 
 
 
 
 
 
 
 
 
 
 
 
 
 
 
 
 
 
 
 
 
 
 
 
 
 
 
 
 
<ELECTRONICALLY SIGNED>
                                        By:  Gerald Strickland DO       
11/15/18     0932
D: 10/29/18 1541_______________________________________
T: 10/29/18 1640Miccharis Strickland DO          /nt

## 2018-11-20 ENCOUNTER — HOSPITAL ENCOUNTER (INPATIENT)
Dept: HOSPITAL 96 - M.ERS | Age: 83
LOS: 6 days | Discharge: NURSING, REHAB OR LONG-TERM CARE | DRG: 480 | End: 2018-11-26
Attending: INTERNAL MEDICINE | Admitting: INTERNAL MEDICINE
Payer: MEDICARE

## 2018-11-20 VITALS — DIASTOLIC BLOOD PRESSURE: 102 MMHG | SYSTOLIC BLOOD PRESSURE: 173 MMHG

## 2018-11-20 VITALS — BODY MASS INDEX: 25.51 KG/M2 | HEIGHT: 59.02 IN | WEIGHT: 126.55 LBS

## 2018-11-20 VITALS — DIASTOLIC BLOOD PRESSURE: 97 MMHG | SYSTOLIC BLOOD PRESSURE: 169 MMHG

## 2018-11-20 VITALS — SYSTOLIC BLOOD PRESSURE: 162 MMHG | DIASTOLIC BLOOD PRESSURE: 70 MMHG

## 2018-11-20 DIAGNOSIS — Z79.82: ICD-10-CM
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
## 2018-11-20 DIAGNOSIS — Z95.1: ICD-10-CM

## 2018-11-20 DIAGNOSIS — I50.43: ICD-10-CM

## 2018-11-20 DIAGNOSIS — Z88.0: ICD-10-CM

## 2018-11-20 DIAGNOSIS — I13.0: ICD-10-CM
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
## 2018-11-20 DIAGNOSIS — Z88.2: ICD-10-CM

## 2018-11-20 DIAGNOSIS — Z79.899: ICD-10-CM

## 2018-11-20 DIAGNOSIS — I25.10: ICD-10-CM

## 2018-11-20 DIAGNOSIS — Z90.49: ICD-10-CM

## 2018-11-20 DIAGNOSIS — Z85.3: ICD-10-CM

## 2018-11-20 DIAGNOSIS — N18.4: ICD-10-CM

## 2018-11-20 DIAGNOSIS — Z91.040: ICD-10-CM
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
## 2018-11-20 DIAGNOSIS — Z90.13: ICD-10-CM

## 2018-11-20 DIAGNOSIS — J18.9: ICD-10-CM

## 2018-11-20 DIAGNOSIS — Z80.0: ICD-10-CM

## 2018-11-20 DIAGNOSIS — S72.141A: Primary | ICD-10-CM

## 2018-11-20 LAB
ABSOLUTE EOSINOPHILS: 0.2 THOU/UL (ref 0–0.7)
ABSOLUTE MONOCYTES: 0.7 THOU/UL (ref 0–1.2)
ALBUMIN SERPL-MCNC: 2.8 G/DL (ref 3.4–5)
ALP SERPL-CCNC: 147 U/L (ref 46–116)
ALT SERPL-CCNC: 16 U/L (ref 30–65)
ANION GAP SERPL CALC-SCNC: 14 MMOL/L (ref 7–16)
APTT BLD: 32.6 SECONDS (ref 25–31.3)
AST SERPL-CCNC: 18 U/L (ref 15–37)
BILIRUB SERPL-MCNC: 0.8 MG/DL
BUN SERPL-MCNC: 16 MG/DL (ref 7–18)
CALCIUM SERPL-MCNC: 8.7 MG/DL (ref 8.5–10.1)
CHLORIDE SERPL-SCNC: 106 MMOL/L (ref 98–107)
CO2 SERPL-SCNC: 22 MMOL/L (ref 21–32)
CREAT SERPL-MCNC: 1.5 MG/DL (ref 0.6–1.3)
EOSINOPHIL NFR BLD: 2 %
GLUCOSE SERPL-MCNC: 127 MG/DL (ref 70–99)
GRANULOCYTES NFR BLD MANUAL: 87 %
HCT VFR BLD CALC: 30.1 % (ref 37–47)
HGB BLD-MCNC: 9.8 GM/DL (ref 12–15)
INR PPP: 1.2
LYMPHOCYTES # BLD: 0.4 THOU/UL (ref 0.8–5.3)
LYMPHOCYTES NFR BLD AUTO: 4 %
MCH RBC QN AUTO: 27.7 PG (ref 26–34)
MCHC RBC AUTO-ENTMCNC: 32.4 G/DL (ref 28–37)
MCV RBC: 85.5 FL (ref 80–100)
MONOCYTES NFR BLD: 7 %
MPV: 8 FL. (ref 7.2–11.1)
NEUTROPHILS # BLD: 9.2 THOU/UL (ref 1.6–8.1)
NUCLEATED RBCS: 0 /100WBC
PLATELET # BLD EST: ADEQUATE 10*3/UL
PLATELET COUNT*: 190 THOU/UL (ref 150–400)
POTASSIUM SERPL-SCNC: 3.2 MMOL/L (ref 3.5–5.1)
PROT SERPL-MCNC: 6.3 G/DL (ref 6.4–8.2)
PROTHROMBIN TIME: 11.9 SECONDS (ref 9.2–11.5)
RBC # BLD AUTO: 3.52 MIL/UL (ref 4.2–5)
RBC MORPH BLD: NORMAL
RDW-CV: 19.6 % (ref 10.5–14.5)
SODIUM SERPL-SCNC: 142 MMOL/L (ref 136–145)
WBC # BLD AUTO: 10.6 THOU/UL (ref 4–11)

## 2018-11-20 NOTE — OP
[address]  [address]                    OPERATIVE REPORT              
_______________________________________________________________________________
 
Name:       KRISTIN ROMO            Room:           [address] [place]  
..#:  [id number]      Account #:      [id number]  
Admission:  11/20/18     Attend Phys:    Barbara Mukherjee MD 
Discharge:  11/26/18     Date of Birth:  [birth year]  
         Report #: [id number]
                                                                     [id number]  
_______________________________________________________________________________
THIS REPORT FOR:  //name//                      
 
CC: Barbara Benitez
 
DATE OF SERVICE: 11/21/2018
 
PREOPERATIVE DIAGNOSIS:  Right intertrochanteric femur fracture.
 
POSTOPERATIVE DIAGNOSIS:  Right intertrochanteric femur fracture.
 
PROCEDURE:  Closed reduction and cephalomedullary nailing of right
intertrochanteric femur fracture, physician-directed fluoroscopy less than 1
hour.
 
SURGEON:  Lawrence Andrew DO
 
ASSISTANT:  Chinmay Sesay DO
 
ANESTHESIA:  General.
 
ANTIBIOTICS:  Ancef IV preoperatively.
 
BLOOD LOSS:  50 mL.
 
FLUIDS:  1000 mL lactated Ringer's.
 
COMPLICATIONS:  None.
 
SPECIMENS:  Femoral reamings sent to pathology.
 
DRAINS:  None.
 
CONDITION:  The patient is stable to PACU.
 
IMPLANTS:  [place] gamma nail, 11 x 340 mm x 125 degree with a 95 mm lag screw
and one 5 mm interlocking screw statically.
 
INDICATIONS FOR PROCEDURE:  The patient was admitted to [facility] after she had a fall and sustained a right hip fracture.  She has
undergone operative fixation for a left hip fracture just within the last month.
 She is having pain in that extremity.  Recommended operative fixation of the
right hip, went over this with her and her sisters from ____ as well as her
biological sister who were all present, just any questions they had, went over
with them the plan for surgery as well as all details associated with it
including risks and complications.  Please see my consultation note for full
 
 
 
[address]  [address]                    OPERATIVE REPORT              
_______________________________________________________________________________
 
Name:       KRISTIN ROMO            Room:           [address] [place]  
[facility]#:  [id number]      Account #:      [id number]  
Admission:  11/20/18     Attend Phys:    Barbara Mukherjee MD 
Discharge:  11/26/18     Date of Birth:  [birth year]  
         Report #: [id number]
                                                                     [id number]  
_______________________________________________________________________________
details of our discussion we had.  We obtained consent to proceed.
 
DESCRIPTION OF PROCEDURE:  I marked the right lower extremity in the presence of
the operative team members, everyone agreed this was correct.  She was taken
back to the operative suite where a briefing was performed indicating correct
patient, procedure, site, antibiotics and that implants were present and
sterile.  All team members agreed.  General anesthetic was administered.  She
was transferred over to the operative table in supine position, well-padded and
secured.  The table was used to perform a closed reduction, which was confirmed
on multiplanar C-arm imaging to be appropriate.  Right lower extremity was then
sterilely prepped and draped in standard fashion.  Timeout was performed
indicating correct patient, procedure, and site, antibiotics and that implants
were present sterile.  All team members agreed.  We marked out our incisions
with the help of C-arm.  We then made an incision proximal to the greater
trochanter, scalpel through skin and careful soft tissue dissection down to the
greater trochanter.  A guidewire was inserted at the appropriate position,
confirmed on multiplanar C-arm imaging.  We then reamed over that guidewire.  We
then placed a ball tip guidewire down, confirmed that it was in the appropriate
position on AP and lateral images of the knee.  I then went back up to the hip
and measured, it was measuring for 340 mm.  Therefore, we performed an implant
timeout and the 11 x 340 mm x 125 degree nail was drawn on to the back table. 
Prior to inserting this, we reamed over the guidewire with a 12.5 mm reamer,
inserted the connor over this, made an incision for a double sleeve, inserted that
down to bone.  A guidewire for the lag screw was placed in the appropriate
position on AP and lateral images, measured, reamed to 90 and placed a 95 screw.
 We then engaged the set screw and backed off 1 quarter turn.  Confirmed the set
screw was engaged by trying to turn the handle that could not be.  We therefore
removed the screwdriver handle, guidewire and external aiming guide.  C-arm
images were saved AP and lateral showing excellent position of hardware and
fracture reduction.  We then turned our attention distally where perfect [place]
technique was performed to place a static screw.  Scalpel through skin drilled
and measured and placed the appropriate size static screw.  C-arm images showed
that this was within the nail and well seated.  Saved those images, dismissed
C-arm.  Thoroughly irrigated all incision sites with normal saline and closure
was with 0 Vicryl figure-of-eight buried deep, 2-0 Monocryl, subQ and then
staples for skin.  All counts were correct and final, based off of our operative
debriefing where we confirmed the procedure, blood loss and the counts were
correct and final.  All team members agreed.  Sterile dressings of Xeroform
gauze, 4 x 4s, ABD and tape were applied.  She was extubated and transferred off
the operative table, taken to PACU stable.
 
POSTOPERATIVE COURSE AND EVALUATION:  I called her sister on the phone and spoke
with her sisters from [place] addressing questions they had.  They were thankful
for my time and efforts.  When I went to the PACU, she was having a little bit
of low saturation and the recommendation between Dr. Franco, the Anesthesia,
myself was that she go to the ICU for closer observation.  [place] [address]                    OPERATIVE REPORT              
_______________________________________________________________________________
 
Name:       KRISTIN ROMOANOR            Room:           [address] [place]  
M.R.#:  [id number]      Account #:      [id number]  
Admission:  11/20/18     Attend Phys:    Barbara Mukherjee MD 
Discharge:  11/26/18     Date of Birth:  [birth year]  
         Report #: [id number]
                                                                     [id number]  
_______________________________________________________________________________
as tolerated.  DVT prophylaxis will be pharmacological and mechanical.  PT, OT
made sure that the care team does not hesitate to call me any time with
questions or concerns.
 
 
 
 
 
 
 
 
 
 
 
 
 
 
 
 
 
 
 
 
 
 
 
 
 
 
 
 
 
 
 
 
 
 
 
 
 
 
 
 
 
                       
                                        By:                                
                 
D: 11/21/18 1821_______________________________________
T: 11/21/18 1938Lawrence Andrew DO                /nt

## 2018-11-21 VITALS — DIASTOLIC BLOOD PRESSURE: 78 MMHG | SYSTOLIC BLOOD PRESSURE: 141 MMHG

## 2018-11-21 VITALS — DIASTOLIC BLOOD PRESSURE: 90 MMHG | SYSTOLIC BLOOD PRESSURE: 146 MMHG

## 2018-11-21 VITALS — SYSTOLIC BLOOD PRESSURE: 115 MMHG | DIASTOLIC BLOOD PRESSURE: 57 MMHG

## 2018-11-21 VITALS — DIASTOLIC BLOOD PRESSURE: 55 MMHG | SYSTOLIC BLOOD PRESSURE: 106 MMHG

## 2018-11-21 VITALS — SYSTOLIC BLOOD PRESSURE: 108 MMHG | DIASTOLIC BLOOD PRESSURE: 57 MMHG

## 2018-11-21 VITALS — SYSTOLIC BLOOD PRESSURE: 109 MMHG | DIASTOLIC BLOOD PRESSURE: 52 MMHG

## 2018-11-21 VITALS — DIASTOLIC BLOOD PRESSURE: 67 MMHG | SYSTOLIC BLOOD PRESSURE: 130 MMHG

## 2018-11-21 VITALS — SYSTOLIC BLOOD PRESSURE: 141 MMHG | DIASTOLIC BLOOD PRESSURE: 78 MMHG

## 2018-11-21 VITALS — DIASTOLIC BLOOD PRESSURE: 54 MMHG | SYSTOLIC BLOOD PRESSURE: 112 MMHG

## 2018-11-21 LAB
ANION GAP SERPL CALC-SCNC: 12 MMOL/L (ref 7–16)
BUN SERPL-MCNC: 15 MG/DL (ref 7–18)
CALCIUM SERPL-MCNC: 8.2 MG/DL (ref 8.5–10.1)
CHLORIDE SERPL-SCNC: 108 MMOL/L (ref 98–107)
CO2 SERPL-SCNC: 21 MMOL/L (ref 21–32)
CREAT SERPL-MCNC: 1.4 MG/DL (ref 0.6–1.3)
GLUCOSE SERPL-MCNC: 112 MG/DL (ref 70–99)
HCT VFR BLD CALC: 28.1 % (ref 37–47)
HGB BLD-MCNC: 9.3 GM/DL (ref 12–15)
MAGNESIUM SERPL-MCNC: 1.6 MG/DL (ref 1.8–2.4)
MCH RBC QN AUTO: 28.3 PG (ref 26–34)
MCHC RBC AUTO-ENTMCNC: 33 G/DL (ref 28–37)
MCV RBC: 85.7 FL (ref 80–100)
MPV: 8.6 FL. (ref 7.2–11.1)
PLATELET COUNT*: 170 THOU/UL (ref 150–400)
POTASSIUM SERPL-SCNC: 3.3 MMOL/L (ref 3.5–5.1)
RBC # BLD AUTO: 3.28 MIL/UL (ref 4.2–5)
RDW-CV: 19.7 % (ref 10.5–14.5)
SODIUM SERPL-SCNC: 141 MMOL/L (ref 136–145)
WBC # BLD AUTO: 9.6 THOU/UL (ref 4–11)

## 2018-11-21 PROCEDURE — 0QS634Z REPOSITION RIGHT UPPER FEMUR WITH INTERNAL FIXATION DEVICE, PERCUTANEOUS APPROACH: ICD-10-PCS | Performed by: ORTHOPAEDIC SURGERY

## 2018-11-21 NOTE — EKG
[place] [address]
Phone:  [phone]                     ELECTROCARDIOGRAM REPORT      
_______________________________________________________________________________
 
Name:       KRISTIN ROMO            Room:           [address]    [place]M [place]  
.R.#:  [id number]      Account #:      [id number]  
Admission:  18     Attend Phys:    Barbara Mukherjee MD 
Discharge:               Date of Birth:  [birth year]  
         Report #: [id number]
    [id number]
_______________________________________________________________________________
THIS REPORT FOR:  //name//                      
 
                         [facility] ED
                                       
Test Date:    2018               Test Time:    19:53:42
Pat Name:     KRISTIN ROMO              Department:   
Patient ID:   [id number]            Room:         [facility]
Gender:       F                        Technician:   WA
:          [id number]               Requested By: Donte Moreno
Order Number: [id number] MD:   Lamonte Medellin
                                 Measurements
Intervals                              Axis          
Rate:         103                      P:            72
MO:           172                      QRS:          11
QRSD:         79                       T:            250
QT:           404                                    
QTc:          529                                    
                           Interpretive Statements
Sinus tachycardia with irregular rate 
Borderline low voltage, extremity leads
Prolonged QT interval
Compared to ECG 2018 03:16:55
Sinus rhythm no longer present
 
T-wave abnormality no longer present
Possible ischemia no longer present
 
Electronically Signed On 2018 14:25:21 CST by Lamonte Medellin
[email or web address]
 
 
 
 
 
 
 
 
 
 
 
 
 
 
  <ELECTRONICALLY SIGNED>
                                           By: Lamonte Medellin MD, FAC      
  18     1425
D: 18   _____________________________________
T: 18   Lamonte Medellin MD, [facility]        /EPI

## 2018-11-22 VITALS — SYSTOLIC BLOOD PRESSURE: 121 MMHG | DIASTOLIC BLOOD PRESSURE: 56 MMHG

## 2018-11-22 VITALS — SYSTOLIC BLOOD PRESSURE: 128 MMHG | DIASTOLIC BLOOD PRESSURE: 59 MMHG

## 2018-11-22 VITALS — DIASTOLIC BLOOD PRESSURE: 59 MMHG | SYSTOLIC BLOOD PRESSURE: 117 MMHG

## 2018-11-22 VITALS — SYSTOLIC BLOOD PRESSURE: 109 MMHG | DIASTOLIC BLOOD PRESSURE: 51 MMHG

## 2018-11-22 VITALS — DIASTOLIC BLOOD PRESSURE: 60 MMHG | SYSTOLIC BLOOD PRESSURE: 131 MMHG

## 2018-11-22 VITALS — SYSTOLIC BLOOD PRESSURE: 97 MMHG | DIASTOLIC BLOOD PRESSURE: 58 MMHG

## 2018-11-22 VITALS — SYSTOLIC BLOOD PRESSURE: 99 MMHG | DIASTOLIC BLOOD PRESSURE: 49 MMHG

## 2018-11-22 VITALS — SYSTOLIC BLOOD PRESSURE: 109 MMHG | DIASTOLIC BLOOD PRESSURE: 56 MMHG

## 2018-11-22 VITALS — DIASTOLIC BLOOD PRESSURE: 60 MMHG | SYSTOLIC BLOOD PRESSURE: 123 MMHG

## 2018-11-22 VITALS — SYSTOLIC BLOOD PRESSURE: 119 MMHG | DIASTOLIC BLOOD PRESSURE: 60 MMHG

## 2018-11-22 VITALS — DIASTOLIC BLOOD PRESSURE: 46 MMHG | SYSTOLIC BLOOD PRESSURE: 88 MMHG

## 2018-11-22 VITALS — SYSTOLIC BLOOD PRESSURE: 124 MMHG | DIASTOLIC BLOOD PRESSURE: 62 MMHG

## 2018-11-22 LAB
ANION GAP SERPL CALC-SCNC: 8 MMOL/L (ref 7–16)
BUN SERPL-MCNC: 23 MG/DL (ref 7–18)
CALCIUM SERPL-MCNC: 8 MG/DL (ref 8.5–10.1)
CHLORIDE SERPL-SCNC: 111 MMOL/L (ref 98–107)
CO2 SERPL-SCNC: 21 MMOL/L (ref 21–32)
CREAT SERPL-MCNC: 1.6 MG/DL (ref 0.6–1.3)
GLUCOSE SERPL-MCNC: 123 MG/DL (ref 70–99)
HCT VFR BLD CALC: 24.7 % (ref 37–47)
HGB BLD-MCNC: 8.2 GM/DL (ref 12–15)
MAGNESIUM SERPL-MCNC: 1.9 MG/DL (ref 1.8–2.4)
POTASSIUM SERPL-SCNC: 5.4 MMOL/L (ref 3.5–5.1)
SODIUM SERPL-SCNC: 140 MMOL/L (ref 136–145)

## 2018-11-23 VITALS — DIASTOLIC BLOOD PRESSURE: 63 MMHG | SYSTOLIC BLOOD PRESSURE: 121 MMHG

## 2018-11-23 VITALS — DIASTOLIC BLOOD PRESSURE: 70 MMHG | SYSTOLIC BLOOD PRESSURE: 134 MMHG

## 2018-11-23 VITALS — SYSTOLIC BLOOD PRESSURE: 115 MMHG | DIASTOLIC BLOOD PRESSURE: 66 MMHG

## 2018-11-23 VITALS — DIASTOLIC BLOOD PRESSURE: 61 MMHG | SYSTOLIC BLOOD PRESSURE: 113 MMHG

## 2018-11-23 VITALS — SYSTOLIC BLOOD PRESSURE: 136 MMHG | DIASTOLIC BLOOD PRESSURE: 68 MMHG

## 2018-11-23 VITALS — DIASTOLIC BLOOD PRESSURE: 57 MMHG | SYSTOLIC BLOOD PRESSURE: 113 MMHG

## 2018-11-23 LAB
HCT VFR BLD CALC: 24.3 % (ref 37–47)
HGB BLD-MCNC: 7.8 GM/DL (ref 12–15)

## 2018-11-24 VITALS — DIASTOLIC BLOOD PRESSURE: 62 MMHG | SYSTOLIC BLOOD PRESSURE: 117 MMHG

## 2018-11-24 VITALS — DIASTOLIC BLOOD PRESSURE: 55 MMHG | SYSTOLIC BLOOD PRESSURE: 121 MMHG

## 2018-11-24 VITALS — SYSTOLIC BLOOD PRESSURE: 125 MMHG | DIASTOLIC BLOOD PRESSURE: 60 MMHG

## 2018-11-24 VITALS — DIASTOLIC BLOOD PRESSURE: 50 MMHG | SYSTOLIC BLOOD PRESSURE: 111 MMHG

## 2018-11-24 VITALS — DIASTOLIC BLOOD PRESSURE: 58 MMHG | SYSTOLIC BLOOD PRESSURE: 115 MMHG

## 2018-11-24 VITALS — DIASTOLIC BLOOD PRESSURE: 50 MMHG | SYSTOLIC BLOOD PRESSURE: 125 MMHG

## 2018-11-25 VITALS — DIASTOLIC BLOOD PRESSURE: 66 MMHG | SYSTOLIC BLOOD PRESSURE: 138 MMHG

## 2018-11-25 VITALS — SYSTOLIC BLOOD PRESSURE: 133 MMHG | DIASTOLIC BLOOD PRESSURE: 62 MMHG

## 2018-11-25 VITALS — DIASTOLIC BLOOD PRESSURE: 57 MMHG | SYSTOLIC BLOOD PRESSURE: 115 MMHG

## 2018-11-25 VITALS — SYSTOLIC BLOOD PRESSURE: 145 MMHG | DIASTOLIC BLOOD PRESSURE: 65 MMHG

## 2018-11-25 VITALS — SYSTOLIC BLOOD PRESSURE: 125 MMHG | DIASTOLIC BLOOD PRESSURE: 58 MMHG

## 2018-11-26 VITALS — SYSTOLIC BLOOD PRESSURE: 128 MMHG | DIASTOLIC BLOOD PRESSURE: 63 MMHG

## 2018-11-26 VITALS — SYSTOLIC BLOOD PRESSURE: 131 MMHG | DIASTOLIC BLOOD PRESSURE: 64 MMHG

## 2018-11-26 VITALS — DIASTOLIC BLOOD PRESSURE: 69 MMHG | SYSTOLIC BLOOD PRESSURE: 138 MMHG

## 2018-11-27 NOTE — PATH
[address]  [address]                    PATHOLOGY RPT PROCEDURE       
_______________________________________________________________________________
 
Name:       KRISTIN MURPHYANOR            Room:           [address] [place]  
M.R.#:  [id number]      Account #:      [id number]  
Admission:  11/20/18     Date of Birth:  [birth year]  
Discharge:  11/26/18                   Report #:    [id number]
                                                         Path Case #: [id number]
_______________________________________________________________________________
 
[facility] Accession Number: [id number]
.                                                                01
Material submitted:                                        .
RIGHT FEMORAL REAMINGS
.                                                                01
Clinician provided ICD-10:
S72.141A
.                                                                01
Clinical history:                                          .
Preop DX: Intertrochanteric fracture
Postop DX: Right intratrochanteric fracture
History of breast cancer
R/O metastatic cancer
.                                                                02
**********************************************************************
Diagnosis:
Right femoral reamings:
- Fragments of benign and viable cancellous bone and abundant benign
hematopoietic elements.
(JOHNNY:pit 11/26/2018)
QTP/11/26/2018
**********************************************************************
.                                                                02
Electronically signed:                                     .
Patel Gunter MD, Pathologist
NPI- [id number]
.                                                                01
Gross description:                                         .
Received in formalin labeled "Kristin Murphy, right femoral reaming," is a
2.7 x 2.6 x 0.3 cm aggregate of granular, dark tan-brown reamings.  The
specimen is submitted entirely in cassette A1, following decalcification.
(DAC; 11/23/2018)
XDC/XDC
.                                                                02
Pathologist provided ICD-10:
S72.141A, Z85.3
.                                                                02
CPT                                                        .
511100, 273934
Specimen Comment: A courtesy copy of this report has been sent to
Specimen Comment: [phone], [phone], [phone].
Specimen Comment: Report sent to ,DR BONE / DR COBOS
Specimen Comment: A duplicate report has been generated due to demographic
updates.
***Performed at:  01
   [facility] [address], [place], [place]  [id number]
   MD Vince Alvares MD Phone:  [phone]
 
[place] [address]                    PATHOLOGY RPT PROCEDURE       
_______________________________________________________________________________
 
Name:       KRISTIN MURPHYANOR            Room:           [address] [place]  
M.R.#:  [id number]      Account #:      [id number]  
Admission:  11/20/18     Date of Birth:  [birth year]  
Discharge:  11/26/18                   Report #:    [id number]
                                                         Path Case #: [id number]
_______________________________________________________________________________
***Performed at:  02
   [facility]
   [address], [place]  [id number]
   MD Patel Gunter MD Phone:  [phone]
